# Patient Record
Sex: MALE
[De-identification: names, ages, dates, MRNs, and addresses within clinical notes are randomized per-mention and may not be internally consistent; named-entity substitution may affect disease eponyms.]

---

## 2019-06-11 ENCOUNTER — HOSPITAL ENCOUNTER (OUTPATIENT)
Dept: HOSPITAL 92 - BICULT | Age: 64
Discharge: HOME | End: 2019-06-11
Attending: INTERNAL MEDICINE
Payer: COMMERCIAL

## 2019-06-11 DIAGNOSIS — N17.9: Primary | ICD-10-CM

## 2019-06-11 PROCEDURE — 76770 US EXAM ABDO BACK WALL COMP: CPT

## 2019-06-11 NOTE — ULT
RENAL ULTRASOUND:

 

HISTORY: 

Acute renal failure.

 

FINDINGS: 

Real-time imaging of the right and left kidneys were performed.  The right kidney measures 9.1 cm and
 the left kidney 9.6 cm in size.  No signs of cyst, mass, or obstruction. 

 

The bladder is never fully distended . There is a suggestion of some possible bladder wall thickening
, but this could be on the basis of under distention.  

 

IMPRESSION: 

1.  Findings suggesting some mild bladder wall thickening, although this could be related mainly to u
nder distention, although this be related mainly to under distention.  

 

2.  No signs of any obstruction of either kidney.  Renal cortex appears fairly well preserved.  

 

POS: [facility]

## 2020-09-04 ENCOUNTER — HOSPITAL ENCOUNTER (OUTPATIENT)
Dept: HOSPITAL 92 - ULT | Age: 65
Discharge: HOME | End: 2020-09-04
Attending: INTERNAL MEDICINE
Payer: MEDICARE

## 2020-09-04 VITALS — DIASTOLIC BLOOD PRESSURE: 90 MMHG | SYSTOLIC BLOOD PRESSURE: 136 MMHG

## 2020-09-04 VITALS — BODY MASS INDEX: 29.7 KG/M2

## 2020-09-04 DIAGNOSIS — N18.3: ICD-10-CM

## 2020-09-04 DIAGNOSIS — R18.8: Primary | ICD-10-CM

## 2020-09-04 DIAGNOSIS — I12.9: ICD-10-CM

## 2020-09-04 DIAGNOSIS — Z79.899: ICD-10-CM

## 2020-09-04 LAB
APTT PPP: 34.1 SEC (ref 22.9–36.1)
BASOPHILS # BLD AUTO: 0.1 THOU/UL (ref 0–0.2)
BASOPHILS NFR BLD AUTO: 1.1 % (ref 0–1)
EOSINOPHIL # BLD AUTO: 0.1 THOU/UL (ref 0–0.7)
EOSINOPHIL NFR BLD AUTO: 1.9 % (ref 0–10)
HGB BLD-MCNC: 13 G/DL (ref 14–18)
INR PPP: 1.1
LYMPHOCYTES # BLD: 2 THOU/UL (ref 1.2–3.4)
LYMPHOCYTES NFR BLD AUTO: 28.7 % (ref 21–51)
MCH RBC QN AUTO: 27.5 PG (ref 27–31)
MCV RBC AUTO: 88.9 FL (ref 78–98)
MONOCYTES # BLD AUTO: 0.5 THOU/UL (ref 0.11–0.59)
MONOCYTES NFR BLD AUTO: 7.1 % (ref 0–10)
NEUTROPHILS # BLD AUTO: 4.3 THOU/UL (ref 1.4–6.5)
NEUTROPHILS NFR BLD AUTO: 61.3 % (ref 42–75)
NEUTROPHILS NFR FLD: 8 %
NONHEMATIC CELLS NFR FLD MANUAL: 22 %
PLATELET # BLD AUTO: 221 THOU/UL (ref 130–400)
PROTHROMBIN TIME: 14 SEC (ref 12–14.7)
RBC # BLD AUTO: 4.72 MILL/UL (ref 4.7–6.1)
RBC # FLD AUTO: 5608 /CU.MM
WBC # BLD AUTO: 7 THOU/UL (ref 4.8–10.8)
WBC # FLD AUTO: 238 UL

## 2020-09-04 PROCEDURE — 85060 BLOOD SMEAR INTERPRETATION: CPT

## 2020-09-04 PROCEDURE — 0W9G3ZZ DRAINAGE OF PERITONEAL CAVITY, PERCUTANEOUS APPROACH: ICD-10-PCS | Performed by: RADIOLOGY

## 2020-09-04 PROCEDURE — 87070 CULTURE OTHR SPECIMN AEROBIC: CPT

## 2020-09-04 PROCEDURE — 87205 SMEAR GRAM STAIN: CPT

## 2020-09-04 PROCEDURE — 85610 PROTHROMBIN TIME: CPT

## 2020-09-04 PROCEDURE — 85025 COMPLETE CBC W/AUTO DIFF WBC: CPT

## 2020-09-04 PROCEDURE — 85730 THROMBOPLASTIN TIME PARTIAL: CPT

## 2020-09-04 PROCEDURE — 89051 BODY FLUID CELL COUNT: CPT

## 2020-09-04 PROCEDURE — 49083 ABD PARACENTESIS W/IMAGING: CPT

## 2020-09-04 NOTE — ULT
Ultrasound-guided paracentesis:



HISTORY: Chronic kidney disease and ascites.



FINDINGS: Informed consent obtained prior to the procedure. Preprocedural imaging demonstrated intrap
eritoneal free fluid. Only a small amount of intraperitoneal free fluid is seen adjacent to the

right hepatic lobe.



An area was marked in the right upper quadrant, and then meticulously prepped and draped in normal st
erile fashion and anesthetized with 1% buffered lidocaine.



With direct sonographic guidance, a 25-gauge needle was advanced into the abdomen. Approximately 20 m
L of clear straw-colored fluid  was aspirated. The needle was removed, and hemostasis was achieved

with direct pressure. A dry sterile dressing was placed. The patient tolerated the procedure well and
 without immediate complication.



IMPRESSION: 



1. Technically successful ultrasound-guided paracentesis.

2 Small amount of ascites adjacent to the right hepatic lobe.



Reported By: Casper Moy 

Electronically Signed:  9/4/2020 4:38 PM

## 2020-09-22 ENCOUNTER — HOSPITAL ENCOUNTER (INPATIENT)
Dept: HOSPITAL 92 - ERS | Age: 65
LOS: 6 days | Discharge: HOME | DRG: 281 | End: 2020-09-28
Attending: INTERNAL MEDICINE | Admitting: INTERNAL MEDICINE
Payer: MEDICARE

## 2020-09-22 VITALS — BODY MASS INDEX: 31.1 KG/M2

## 2020-09-22 DIAGNOSIS — E11.22: ICD-10-CM

## 2020-09-22 DIAGNOSIS — I42.9: ICD-10-CM

## 2020-09-22 DIAGNOSIS — I47.2: ICD-10-CM

## 2020-09-22 DIAGNOSIS — D89.2: ICD-10-CM

## 2020-09-22 DIAGNOSIS — K42.9: ICD-10-CM

## 2020-09-22 DIAGNOSIS — N17.9: ICD-10-CM

## 2020-09-22 DIAGNOSIS — N18.5: ICD-10-CM

## 2020-09-22 DIAGNOSIS — K43.2: ICD-10-CM

## 2020-09-22 DIAGNOSIS — I50.23: Primary | ICD-10-CM

## 2020-09-22 DIAGNOSIS — Z79.899: ICD-10-CM

## 2020-09-22 DIAGNOSIS — E83.42: ICD-10-CM

## 2020-09-22 DIAGNOSIS — Z23: ICD-10-CM

## 2020-09-22 DIAGNOSIS — M10.9: ICD-10-CM

## 2020-09-22 DIAGNOSIS — K21.9: ICD-10-CM

## 2020-09-22 DIAGNOSIS — I49.3: ICD-10-CM

## 2020-09-22 DIAGNOSIS — M06.9: ICD-10-CM

## 2020-09-22 DIAGNOSIS — Z79.82: ICD-10-CM

## 2020-09-22 DIAGNOSIS — Z20.828: ICD-10-CM

## 2020-09-22 DIAGNOSIS — D63.1: ICD-10-CM

## 2020-09-22 DIAGNOSIS — I21.A1: ICD-10-CM

## 2020-09-22 DIAGNOSIS — I12.9: ICD-10-CM

## 2020-09-22 LAB
ALBUMIN SERPL BCG-MCNC: 3.3 G/DL (ref 3.4–4.8)
ALP SERPL-CCNC: 142 U/L (ref 40–110)
ALT SERPL W P-5'-P-CCNC: 17 U/L (ref 8–55)
ANION GAP SERPL CALC-SCNC: 16 MMOL/L (ref 10–20)
AST SERPL-CCNC: 19 U/L (ref 5–34)
BASOPHILS # BLD AUTO: 0.1 THOU/UL (ref 0–0.2)
BASOPHILS NFR BLD AUTO: 1 % (ref 0–1)
BILIRUB SERPL-MCNC: 0.9 MG/DL (ref 0.2–1.2)
BUN SERPL-MCNC: 30 MG/DL (ref 8.4–25.7)
CALCIUM SERPL-MCNC: 8.7 MG/DL (ref 7.8–10.44)
CHLORIDE SERPL-SCNC: 109 MMOL/L (ref 98–107)
CK MB SERPL-MCNC: 6.1 NG/ML (ref 0–6.6)
CO2 SERPL-SCNC: 20 MMOL/L (ref 23–31)
CREAT CL PREDICTED SERPL C-G-VRATE: 0 ML/MIN (ref 70–130)
EOSINOPHIL # BLD AUTO: 0.1 THOU/UL (ref 0–0.7)
EOSINOPHIL NFR BLD AUTO: 1.8 % (ref 0–10)
GLOBULIN SER CALC-MCNC: 3 G/DL (ref 2.4–3.5)
GLUCOSE SERPL-MCNC: 108 MG/DL (ref 80–115)
HGB BLD-MCNC: 12.8 G/DL (ref 14–18)
LYMPHOCYTES # BLD: 1.8 THOU/UL (ref 1.2–3.4)
LYMPHOCYTES NFR BLD AUTO: 23.3 % (ref 21–51)
MCH RBC QN AUTO: 27.7 PG (ref 27–31)
MCV RBC AUTO: 88.3 FL (ref 78–98)
MONOCYTES # BLD AUTO: 0.5 THOU/UL (ref 0.11–0.59)
MONOCYTES NFR BLD AUTO: 7 % (ref 0–10)
NEUTROPHILS # BLD AUTO: 5 THOU/UL (ref 1.4–6.5)
NEUTROPHILS NFR BLD AUTO: 66.8 % (ref 42–75)
PLATELET # BLD AUTO: 220 THOU/UL (ref 130–400)
POTASSIUM SERPL-SCNC: 4 MMOL/L (ref 3.5–5.1)
RBC # BLD AUTO: 4.63 MILL/UL (ref 4.7–6.1)
SODIUM SERPL-SCNC: 141 MMOL/L (ref 136–145)
TROPONIN I SERPL DL<=0.01 NG/ML-MCNC: 0.06 NG/ML (ref ?–0.03)
WBC # BLD AUTO: 7.5 THOU/UL (ref 4.8–10.8)

## 2020-09-22 PROCEDURE — 93017 CV STRESS TEST TRACING ONLY: CPT

## 2020-09-22 PROCEDURE — U0003 INFECTIOUS AGENT DETECTION BY NUCLEIC ACID (DNA OR RNA); SEVERE ACUTE RESPIRATORY SYNDROME CORONAVIRUS 2 (SARS-COV-2) (CORONAVIRUS DISEASE [COVID-19]), AMPLIFIED PROBE TECHNIQUE, MAKING USE OF HIGH THROUGHPUT TECHNOLOGIES AS DESCRIBED BY CMS-2020-01-R: HCPCS

## 2020-09-22 PROCEDURE — 96365 THER/PROPH/DIAG IV INF INIT: CPT

## 2020-09-22 PROCEDURE — 80053 COMPREHEN METABOLIC PANEL: CPT

## 2020-09-22 PROCEDURE — 77075 RADEX OSSEOUS SURVEY COMPL: CPT

## 2020-09-22 PROCEDURE — 97139 UNLISTED THERAPEUTIC PX: CPT

## 2020-09-22 PROCEDURE — 93306 TTE W/DOPPLER COMPLETE: CPT

## 2020-09-22 PROCEDURE — 93005 ELECTROCARDIOGRAM TRACING: CPT

## 2020-09-22 PROCEDURE — 82232 ASSAY OF BETA-2 PROTEIN: CPT

## 2020-09-22 PROCEDURE — 93798 PHYS/QHP OP CAR RHAB W/ECG: CPT

## 2020-09-22 PROCEDURE — 93458 L HRT ARTERY/VENTRICLE ANGIO: CPT

## 2020-09-22 PROCEDURE — 83880 ASSAY OF NATRIURETIC PEPTIDE: CPT

## 2020-09-22 PROCEDURE — 76942 ECHO GUIDE FOR BIOPSY: CPT

## 2020-09-22 PROCEDURE — 78452 HT MUSCLE IMAGE SPECT MULT: CPT

## 2020-09-22 PROCEDURE — 96375 TX/PRO/DX INJ NEW DRUG ADDON: CPT

## 2020-09-22 PROCEDURE — 94760 N-INVAS EAR/PLS OXIMETRY 1: CPT

## 2020-09-22 PROCEDURE — 84484 ASSAY OF TROPONIN QUANT: CPT

## 2020-09-22 PROCEDURE — 80306 DRUG TEST PRSMV INSTRMNT: CPT

## 2020-09-22 PROCEDURE — 36415 COLL VENOUS BLD VENIPUNCTURE: CPT

## 2020-09-22 PROCEDURE — A9500 TC99M SESTAMIBI: HCPCS

## 2020-09-22 PROCEDURE — 82553 CREATINE MB FRACTION: CPT

## 2020-09-22 PROCEDURE — G0009 ADMIN PNEUMOCOCCAL VACCINE: HCPCS

## 2020-09-22 PROCEDURE — 87635 SARS-COV-2 COVID-19 AMP PRB: CPT

## 2020-09-22 PROCEDURE — 83883 ASSAY NEPHELOMETRY NOT SPEC: CPT

## 2020-09-22 PROCEDURE — 84165 PROTEIN E-PHORESIS SERUM: CPT

## 2020-09-22 PROCEDURE — P9047 ALBUMIN (HUMAN), 25%, 50ML: HCPCS

## 2020-09-22 PROCEDURE — 90471 IMMUNIZATION ADMIN: CPT

## 2020-09-22 PROCEDURE — 90732 PPSV23 VACC 2 YRS+ SUBQ/IM: CPT

## 2020-09-22 PROCEDURE — 71046 X-RAY EXAM CHEST 2 VIEWS: CPT

## 2020-09-22 PROCEDURE — 83735 ASSAY OF MAGNESIUM: CPT

## 2020-09-22 PROCEDURE — 80048 BASIC METABOLIC PNL TOTAL CA: CPT

## 2020-09-22 PROCEDURE — 85025 COMPLETE CBC W/AUTO DIFF WBC: CPT

## 2020-09-22 PROCEDURE — 80061 LIPID PANEL: CPT

## 2020-09-22 NOTE — RAD
CHEST TWO VIEWS:

9/22/20

 

COMPARISON: 

None. 

 

HISTORY: 

Fluid overload.

 

FINDINGS: 

Cardiac silhouette appears enlarged.  Small bilateral pleural effusions are suspected, left greater t
kelsey right. There is pulmonary vascular congestion. No pneumothorax is seen. No lobar consolidation or
 alveolar edema. 

 

IMPRESSION: 

Enlarged cardiac silhouette with small bilateral pleural effusions and pulmonary vascular congestion 
suspicious for congestive failure and associated edema. Recommend follow-up imaging following treatme
nt to document resolution. 

 

POS: [facility]

## 2020-09-22 NOTE — PDOC.HHP
Hospitalist HPI





- History of Present Illness


Volume overload, shortness of breath


History of Present Illness: 


Patient is a 65 year old male with PMH hernia, gout, HTN, CKD, GERD sent from Dr Brody office for volume overload. Patient is a poor historian, but reports that 

Dr Bailey is basically his PCP since his wife saw Dr Bailey while she was on HD (now 

has kidney transplant). He went to Dr Bailey today for some shortness of breath 

which has been worsening over last several months, worse with exertion. He also 

reports extreme tiredness, swelling in BLE. Patient is on lasix every other day 

which was helping somewhat, he has been on this for a few months. He does report

some CKD history, GFR 30 here, but denies any history of urinary retention, no 

history of CHF or other cardiac testing or cardiac history. He deniest chest 

pain or palptiations. Dr Bailey observed signs of overload, such as JVD and 

abnormal EKG, patient referred to ED for cardiac workup. In ED, CXR w/ 

congestion, TnI 0.075, BNP over 99957, exam w/ s3, 2+ pitting edema, vital signs

hypertensive and tachypneic, EKG w/ 1st degree AVB, nonspecfic T wave changes, 

patient received asa, nitropaste, IV lasix, admitted for further workup and 

care.





Hospitalist ROS





- Review of Systems


Constitutional: denies: fever, chills, sweats, weakness, malaise, other


Eyes: denies: pain, vision change, conjunctivae inflammation, eyelid 

inflammation, redness, other


ENT: denies: ear pain, ear discharge, nose pain, nose discharge, nose congest

ion, mouth pain, mouth swelling, throat pain, throat swelling, other


Respiratory: reports: shortness of breath, SOB with excertion.  denies: cough, 

dry, hemoptysis, pleuritic pain, sputum, wheezing, other


Cardiovascular: denies: chest pain, palpitations, orthopnea, paroxysmal noc. 

dyspnea, edema, light headedness, other


Gastrointestinal: denies: nausea, vomiting, abdominal pain, diarrhea, 

constipation, melena, hematochezia, other


Genitourinary: denies: dysuria, frequency, incontinence, hematuria, retention, 

other


Musculoskeletal: denies: neck pain, shoulder pain, arm pain, back pain, hand 

pain, leg pain, foot pain, other


Skin: denies: rash, lesions, kacy, bruising, other


Neurological: denies: weakness, numbness, incoordination, change in speech, 

confusion, seizures, other


All other systems reviewed; all pertinent +/- noted in HPI/Subj





- Medication


Medications: 


metoprolol tartrate oral Tue Sep 22, 2020 19:39 ABUNDIO Freitas, Fay 


tablet : Strength - 25 mg : ORAL


Patient Dose: 1 tab(s) Oral once a day (at bedtime). 


omeprazole Tue Sep 22, 2020 19:40 ABUNDIO Freitas, Fay 


capsule,delayed release(DR/EC) : Strength - 10 mg : ORAL


Patient Dose: Unknown. 


Lasix oral Tue Sep 22, 2020 19:40 ABUNDIO Freitas Mikayla 


tablet : Strength - 40 mg : ORAL


Patient Dose: 1 tab(s) Oral once a day.TAKE ONE PILL EVERY OTHER DAY.





Hospitalist History





- Past Medical History


Other Medical History: 


hernia, gout, HTN, CKD, GERD





- Past Surgical History


Other Surgical History: 


colon surgery





- Family History


Family History: reports: no pertinent history





- Social History


Smoking Status: Never smoker


Alcohol: reports: None


Drugs: reports: none





- Exam


General Appearance: NAD, awake alert


Eye: PERRL, anicteric sclera


ENT: normocephalic atraumatic, no oropharyngeal lesions, moist mucosa


Neck: supple, symmetric, no JVD, no thyromegaly, no lymphadenopathy, no carotid 

bruit


Heart: RRR, no murmur, no gallops, no rubs, normal peripheral pulses


Heart - other findings: s3


Respiratory: CTAB, normal chest expansion, no tachypnea, normal percussion


Respiratory - other findings: bibasilar dependant crackles.


Gastrointestinal: soft, non-tender, non-distended, normal bowel sounds, no 

palpable masses, no hepatomegaly, no splenomegaly, no bruit


Extremities: no cyanosis, no clubbing, 2+ LE edema


Skin: normal turgor, no lesions, no rashes


Neurological: cranial nerve grossly intact, normal sensation to touch, no 

weakness, no focal deficits, no new deficit


Musculoskeletal: normal tone, normal strength, no muscle wasting


Psychiatric: normal affect, normal behavior, A&O x 3





Hospitalist Results





- Labs


Result Diagrams: 


                                 09/23/20 03:33





                                 09/22/20 17:21


Lab results: 


                                        











WBC  7.5 thou/uL (4.8-10.8)   09/22/20  17:21    


 


Hgb  12.8 g/dL (14.0-18.0)  L  09/22/20  17:21    


 


Hct  40.9 % (42.0-52.0)  L  09/22/20  17:21    


 


MCV  88.3 fL (78.0-98.0)   09/22/20  17:21    


 


Plt Count  220 thou/uL (130-400)   09/22/20  17:21    


 


Neutrophils %  66.8 % (42.0-75.0)   09/22/20  17:21    


 


Sodium  141 mmol/L (136-145)   09/22/20  17:21    


 


Potassium  4.0 mmol/L (3.5-5.1)   09/22/20  17:21    


 


Chloride  109 mmol/L ()  H  09/22/20  17:21    


 


Carbon Dioxide  20 mmol/L (23-31)  L  09/22/20  17:21    


 


BUN  30 mg/dL (8.4-25.7)  H  09/22/20  17:21    


 


Creatinine  2.64 mg/dL (0.7-1.3)  H  09/22/20  17:21    


 


Glucose  108 mg/dL ()   09/22/20  17:21    


 


Calcium  8.7 mg/dL (7.8-10.44)   09/22/20  17:21    


 


Total Bilirubin  0.9 mg/dL (0.2-1.2)   09/22/20  17:21    


 


AST  19 U/L (5-34)   09/22/20  17:21    


 


ALT  17 U/L (8-55)   09/22/20  17:21    


 


Alkaline Phosphatase  142 U/L ()  H  09/22/20  17:21    


 


CK-MB (CK-2)  6.1 ng/mL (0-6.6)   09/22/20  17:21    


 


Troponin I  0.064 ng/mL (< 0.028)  H  09/22/20  20:44    


 


B-Natriuretic Peptide  85591.3 pg/mL (0-100)  H  09/22/20  17:21    


 


Serum Total Protein  6.3 g/dL (5.8-8.1)   09/22/20  17:21    


 


Albumin  3.3 g/dL (3.4-4.8)  L  09/22/20  17:21    











Additional comment: 





VITAL SIGNS Tue Sep 22, 2020 19:43 ABUNDIO Freitas, Fay 


BP: 145/116


Pulse: 89


Resp: 22


Temp: 97.7 (Oral)


Pain: 0


O2 sat: 96 on (Room Air)


Time: 9/22/2020 19:43.





- EKG Interpretation


EKG: 





NSR 89 bpm, QTc 464, /AVB, no acute St changes, nonspecific T wave changes





Hospitalist H&P A/P





- Plan


Plan: 


Patient is a 65 year old male with PMH hernia, gout, HTN, CKD, GERD sent from Dr Brody office for volume overload.





# volume overload


# CKD III-IV


# possible CHF


# elevated troponin - concerning for CHF


Patient went to Dr Bailey today for progressive shortness of breath, fatigue, 

swelling in BLE, on PO lasix, no history of CHF or other cardiac testing or 

cardiac history. He denies chest pain or palptiations. In ED, CXR w/ congestion,

TnI 0.075, BNP over 97384, exam w/ s3, 2+ pitting edema, vital signs hypertensiv

e and tachypneic, EKG w/ 1st degree AVB, nonspecfic T wave changes, patient 

received asa, nitropaste, IV lasix, admitted for further workup and care.


- admit to telemetry


- start IV lasix


- consult cardiology


- echo, stress test


- I/Os


- continue asa, nitropatch, statin, BB





# hypomagnisemia - replacement parameters





# HTN - PRN medications available





# gout - resume home meds once med rec complete





# GERD - ppx ordered





DVT/GI ppx


Full code

## 2020-09-23 LAB
ANION GAP SERPL CALC-SCNC: 15 MMOL/L (ref 10–20)
BASOPHILS # BLD AUTO: 0.1 THOU/UL (ref 0–0.2)
BASOPHILS NFR BLD AUTO: 0.7 % (ref 0–1)
BUN SERPL-MCNC: 29 MG/DL (ref 8.4–25.7)
CALCIUM SERPL-MCNC: 8.6 MG/DL (ref 7.8–10.44)
CHD RISK SERPL-RTO: 3.1 (ref ?–4.5)
CHLORIDE SERPL-SCNC: 109 MMOL/L (ref 98–107)
CHOLEST SERPL-MCNC: 117 MG/DL
CO2 SERPL-SCNC: 21 MMOL/L (ref 23–31)
CREAT CL PREDICTED SERPL C-G-VRATE: 38 ML/MIN (ref 70–130)
EOSINOPHIL # BLD AUTO: 0.3 THOU/UL (ref 0–0.7)
EOSINOPHIL NFR BLD AUTO: 3.1 % (ref 0–10)
GLUCOSE SERPL-MCNC: 139 MG/DL (ref 80–115)
HDLC SERPL-MCNC: 38 MG/DL
HGB BLD-MCNC: 11.8 G/DL (ref 14–18)
LDLC SERPL CALC-MCNC: 61 MG/DL
LYMPHOCYTES # BLD: 1.8 THOU/UL (ref 1.2–3.4)
LYMPHOCYTES NFR BLD AUTO: 21.2 % (ref 21–51)
MAGNESIUM SERPL-MCNC: 1.7 MG/DL (ref 1.6–2.6)
MCH RBC QN AUTO: 26.9 PG (ref 27–31)
MCV RBC AUTO: 88.6 FL (ref 78–98)
MONOCYTES # BLD AUTO: 0.7 THOU/UL (ref 0.11–0.59)
MONOCYTES NFR BLD AUTO: 7.8 % (ref 0–10)
NEUTROPHILS # BLD AUTO: 5.7 THOU/UL (ref 1.4–6.5)
NEUTROPHILS NFR BLD AUTO: 67.2 % (ref 42–75)
PLATELET # BLD AUTO: 212 THOU/UL (ref 130–400)
POTASSIUM SERPL-SCNC: 3.6 MMOL/L (ref 3.5–5.1)
RBC # BLD AUTO: 4.4 MILL/UL (ref 4.7–6.1)
SODIUM SERPL-SCNC: 141 MMOL/L (ref 136–145)
TRIGL SERPL-MCNC: 92 MG/DL (ref ?–150)
TROPONIN I SERPL DL<=0.01 NG/ML-MCNC: 0.09 NG/ML (ref ?–0.03)
TROPONIN I SERPL DL<=0.01 NG/ML-MCNC: 0.09 NG/ML (ref ?–0.03)
WBC # BLD AUTO: 8.5 THOU/UL (ref 4.8–10.8)

## 2020-09-23 PROCEDURE — 3E0234Z INTRODUCTION OF SERUM, TOXOID AND VACCINE INTO MUSCLE, PERCUTANEOUS APPROACH: ICD-10-PCS | Performed by: INTERNAL MEDICINE

## 2020-09-23 RX ADMIN — HEPARIN SODIUM SCH: 5000 INJECTION, SOLUTION INTRAVENOUS; SUBCUTANEOUS at 08:52

## 2020-09-23 RX ADMIN — ASPIRIN 81 MG CHEWABLE TABLET SCH MG: 81 TABLET CHEWABLE at 08:51

## 2020-09-23 RX ADMIN — Medication SCH ML: at 20:26

## 2020-09-23 RX ADMIN — Medication SCH ML: at 08:53

## 2020-09-23 RX ADMIN — NITROGLYCERIN SCH: 20 OINTMENT TOPICAL at 05:56

## 2020-09-23 RX ADMIN — NITROGLYCERIN SCH INCH: 20 OINTMENT TOPICAL at 15:32

## 2020-09-23 RX ADMIN — HEPARIN SODIUM SCH UNITS: 5000 INJECTION, SOLUTION INTRAVENOUS; SUBCUTANEOUS at 20:25

## 2020-09-23 RX ADMIN — NITROGLYCERIN SCH INCH: 20 OINTMENT TOPICAL at 20:25

## 2020-09-23 NOTE — CON
DATE OF CONSULTATION:  09/23/2020



REASON FOR CONSULTATION:  Congestive heart failure, abnormal stress test.



HISTORY OF PRESENT ILLNESS:  Mr. Gomez is a 65-year-old patient.  The patient has a

history of renal insufficiency, also has a history of some shortness of breath, but

in the last few months, his shortness of breath has been progressively worse.  It is

worse with exertion and now with a very low level activity.  He also noted swelling

of his lower extremities. 



He was taking Lasix every other day, which helped, but the problem was continuing to

worsen. 



The patient was sent to the emergency room, found to be in congestive heart failure. 



He has received Lasix with some results. 



No chest pain or pressure.



MEDICATIONS:  

1. Lasix every other day.

2. Metoprolol 25 mg each evening.

3. Allopurinol.

4. Multivitamin.



ALLERGIES:  NONE KNOWN.



SOCIAL HISTORY:  No alcohol or tobacco.  He is .  His wife is accompanying

him.  His wife has had renal transplantation. 



REVIEW OF SYSTEMS:  CONSTITUTIONAL:  No significant weight loss. 

VISION:  No changes. 

HEARING:  No changes. 

PULMONARY:  Positive for shortness of breath. 

CARDIAC:  Positive for shortness of breath. 

GASTROINTESTINAL:  No nausea, vomiting, or diarrhea. 

SKIN:  No rashes. 

NEUROLOGIC:  No unilateral weakness or numbness. 

PSYCHIATRIC:  No unusual depression or anxiety. 

HEMATOLOGIC:  No unusual bruising. 

GENITOURINARY:  No burning with urination.



PHYSICAL EXAMINATION:

GENERAL:  This is a pleasant 65-year-old man, resting comfortably, in no distress. 

VITAL SIGNS:  Blood pressure 130/90.  Pulse in the 90s, it is sinus. 

NECK:  Veins are markedly distended, really up to the angle of the jaw when he is

lying back about 45 degrees. 

LUNGS:  Few basilar rales. 

CARDIAC:  Normal S1, normal S2.  I do not hear murmur, rub, or gallop. 

ABDOMEN:  Soft, nontender, he may have some ascites.  He has a large ventral hernia. 

EXTREMITIES:  Moderate edema, some of which looks chronic.  Chronic venous stasis

changes. 

PULSES:  I do not feel pedal pulses.  I do feel femoral pulses, right better than

the left. 



PERTINENT LABORATORY DATA:  The creatinine yesterday was 2.64, which correlates with

a GFR calculated at 29.  I suspect it is not that good as he has substantial amounts

of edema and his dry weight is really lower than what his actual weight is

currently.  EKG, sinus rhythm, possible anterior infarct.  The troponin levels are

elevated, but flat, most recently 0.089.  Stress test revealed ejection fraction

25%, no focal ischemia. 



ASSESSMENT:  

1. Congestive heart failure, systolic, acute on chronic, still in some degree of

heart failure. 

2. Stage IV renal failure.

3. Substantial edema.

4. Probably, he has had some peripheral vascular disease. 



At some point, the patient will need cardiac catheterization, but I would like to

improve his heart failure status first.  We will continue with intravenous

diuretics.  We will tentatively plan to proceed to cardiac catheterization on

Friday.  I discussed the risk with the patient and wife including kidney failure and

need for dialysis, risk of stroke, heart attack, iodine allergy, loss of blood

supply to leg or kidney, stent thrombosis, stent restenosis.  They understand and

wish to proceed.  The patient ultimately may need defibrillator implantation, will

likely need a LifeVest.  Long-term prognosis is guarded in view of this degree of

left ventricular dysfunction with dilated left ventricle in the setting of advanced

renal failure.  We will follow with you.  At this time, could not start ACE

inhibitors or angiotensin receptor blockers in view of the kidney function.  We will

change from metoprolol to carvedilol. 







Job ID:  [id number]

## 2020-09-23 NOTE — NM
CARDIAC SPECT:

 

CLINICAL HISTORY:

65-year-old male with CHF, hypertension. 

 

TECHNIQUE:  

A myocardial perfusion scan was performed using the single isotope one day protocol with technetium-9
9m sestamibi. 10 mCi were injected intravenously for the rest exam followed by 29 mCi for the stress 
exam. Pharmacologic stress with Adenosine was monitored and interpreted by Dr. Guillory. 

 

FINDINGS:

The left ventricular cavity is dilated. No reversible or fixed defects are seen on the stress or rest
 images. 

 

GATED SPECT LVEF:

25%. 

 

WALL MOTION EXAM:

Global hypokinesis. 

 

IMPRESSION: 

No evidence of reversible ischemia. 

 

 

POS: [facility]

## 2020-09-23 NOTE — PDOC.HOSPP
- Subjective


Encounter Date: 09/23/20


Encounter Time: 13:40


Subjective: 


f/u for volume overload and likely CHF in conjunction with CKD. Receiving Lasix 

IV and 





- Objective


Vital Signs & Weight: 


                             Vital Signs (12 hours)











  Temp Pulse Resp BP Pulse Ox


 


 09/23/20 13:25  97.7 F  98  16  120/79  95


 


 09/23/20 07:39  98.5 F  89  12  137/96 H  95


 


 09/23/20 04:00  97.8 F  92  18  130/96 H  97








                                     Weight











Weight                         210 lb 14.4 oz














Result Diagrams: 


                                 09/23/20 03:33





                                 09/23/20 03:33


Additional Labs: 


                                Laboratory Tests











  09/22/20 09/22/20 09/22/20





  17:21 17:21 17:21


 


Hgb   12.8 L 


 


Creatinine    2.64 H


 


Magnesium   


 


Troponin I   


 


B-Natriuretic Peptide  31307.3 H  














  09/22/20 09/22/20 09/22/20





  17:21 17:29 20:44


 


Hgb   


 


Creatinine   


 


Magnesium   1.5 L 


 


Troponin I  0.075 H   0.064 H


 


B-Natriuretic Peptide   














  09/23/20 09/23/20





  01:00 03:33


 


Hgb  


 


Creatinine  


 


Magnesium  


 


Troponin I  0.088 H  0.089 H


 


B-Natriuretic Peptide  











Radiology Reviewed by me: Yes (PCXR - bilat pulm edema)


EKG Reviewed by me: Yes (Tele - SR)





Hospitalist ROS





- Medication


Medications: 


Active Medications











Generic Name Dose Route Start Last Admin





  Trade Name Freq  PRN Reason Stop Dose Admin


 


Aspirin  81 mg  09/23/20 09:00  09/23/20 08:51





  Aspirin Chewable 81 Mg Tab  PO   81 mg





  DAILY KETAN   Administration


 


Furosemide  40 mg  09/23/20 09:00  09/23/20 08:51





  Furosemide 40 Mg/4 Ml Vial  SLOW IVP   40 mg





  DAILY KETAN   Administration


 


Heparin Sodium (Porcine)  5,000 units  09/23/20 09:00  09/23/20 08:52





  Heparin 5,000 Units/Ml Vial  SC   Not Given





  BID KETAN  


 


Nitroglycerin  0.5 inch  09/23/20 06:00  09/23/20 05:56





  Nitroglycerin 2% Ointment 1 Inch/1 Gm Packet  TOP   Not Given





  Q8HR KETAN  


 


Pantoprazole Sodium  40 mg  09/23/20 09:00  09/23/20 08:51





  Pantoprazole 40 Mg Tab  PO   40 mg





  DAILY KETAN   Administration


 


Sodium Chloride  10 ml  09/23/20 09:00  09/23/20 08:53





  Flush - Normal Saline 10 Ml Syringe  IVF   10 ml





  Q12HR KETAN   Administration














- Exam


General Appearance: NAD, awake alert


Eye: PERRL, anicteric sclera


ENT: normocephalic atraumatic, no oropharyngeal lesions


Neck: supple, symmetric, no JVD, no thyromegaly, no lymphadenopathy


Heart: RRR, no gallops, no rubs, normal peripheral pulses


Heart - other findings: S1, S2, faint S3


Respiratory: no ronchi, normal chest expansion, no tachypnea


Respiratory - other findings: bibasilar rales


Gastrointestinal: soft, non-tender, non-distended, normal bowel sounds


Extremities: no cyanosis, 2+ LE edema


Skin: normal turgor


Neurological: cranial nerve grossly intact, no new deficit


Musculoskeletal: normal tone, normal strength


Psychiatric: normal affect, A&O x 3





Hosp A/P


(1) Acute CHF (congestive heart failure)


Code(s): I50.9 - HEART FAILURE, UNSPECIFIED   Status: Acute   


Qualifiers: 


   Heart failure type: unspecified   Qualified Code(s): I50.9 - Heart failure, 

unspecified   


Plan: 


Continue Lasix 40mg IV daily, daily I/O's, daily weight, 2D echo pending








(2) Acute kidney injury superimposed on CKD


Code(s): N17.9 - ACUTE KIDNEY FAILURE, UNSPECIFIED; N18.9 - CHRONIC KIDNEY 

DISEASE, UNSPECIFIED   Status: Acute   


Plan: 


Monitor renal function closely given diuretics, serial creatinine








(3) Type 2 myocardial infarction due to heart failure


Code(s): I50.9 - HEART FAILURE, UNSPECIFIED; I21.A1 - MYOCARDIAL INFARCTION TYPE

2   Status: Acute   


Plan: 


Cardiology consult pending, 2D echo, ? consideration for heart cath, CST pending








(4) Hypomagnesemia


Code(s): E83.42 - HYPOMAGNESEMIA   Status: Acute   


Plan: 


Improved with supplementation, serial monitoring








(5) HTN (hypertension)


Code(s): I10 - ESSENTIAL (PRIMARY) HYPERTENSION   Status: Chronic   


Qualifiers: 


   Hypertension type: essential hypertension   Qualified Code(s): I10 - 

Essential (primary) hypertension   


Plan: 


Continue Metoprolol








- Plan


out of bed/ambulate, DVT proph w/SCDs





Stable currently


Continue Lasix 40mg IV daily


Serial I/O's, Daily weight


2D echo pending


Cardiology consult pending


AM lab: CMP

## 2020-09-24 LAB
ALBUMIN SERPL BCG-MCNC: 3 G/DL (ref 3.4–4.8)
ALP SERPL-CCNC: 119 U/L (ref 40–110)
ALT SERPL W P-5'-P-CCNC: 13 U/L (ref 8–55)
ANION GAP SERPL CALC-SCNC: 15 MMOL/L (ref 10–20)
AST SERPL-CCNC: 20 U/L (ref 5–34)
BILIRUB SERPL-MCNC: 1.3 MG/DL (ref 0.2–1.2)
BUN SERPL-MCNC: 26 MG/DL (ref 8.4–25.7)
CALCIUM SERPL-MCNC: 8.7 MG/DL (ref 7.8–10.44)
CHLORIDE SERPL-SCNC: 109 MMOL/L (ref 98–107)
CO2 SERPL-SCNC: 24 MMOL/L (ref 23–31)
CREAT CL PREDICTED SERPL C-G-VRATE: 40 ML/MIN (ref 70–130)
GLOBULIN SER CALC-MCNC: 3 G/DL (ref 2.4–3.5)
GLUCOSE SERPL-MCNC: 100 MG/DL (ref 80–115)
POTASSIUM SERPL-SCNC: 3.6 MMOL/L (ref 3.5–5.1)
SODIUM SERPL-SCNC: 144 MMOL/L (ref 136–145)

## 2020-09-24 RX ADMIN — HEPARIN SODIUM SCH UNITS: 5000 INJECTION, SOLUTION INTRAVENOUS; SUBCUTANEOUS at 09:10

## 2020-09-24 RX ADMIN — NITROGLYCERIN SCH INCH: 20 OINTMENT TOPICAL at 05:00

## 2020-09-24 RX ADMIN — Medication SCH ML: at 09:10

## 2020-09-24 RX ADMIN — HEPARIN SODIUM SCH UNITS: 5000 INJECTION, SOLUTION INTRAVENOUS; SUBCUTANEOUS at 20:34

## 2020-09-24 RX ADMIN — ASPIRIN 81 MG CHEWABLE TABLET SCH MG: 81 TABLET CHEWABLE at 09:09

## 2020-09-24 RX ADMIN — Medication SCH ML: at 20:35

## 2020-09-24 RX ADMIN — NITROGLYCERIN SCH INCH: 20 OINTMENT TOPICAL at 22:52

## 2020-09-24 RX ADMIN — NITROGLYCERIN SCH INCH: 20 OINTMENT TOPICAL at 15:23

## 2020-09-24 NOTE — PDOC.HOSPP
- Subjective


Encounter Date: 09/24/20


Encounter Time: 12:35


Subjective: 


f/u for Acute/chronic systolic CHF with EF 25% initially managed with IV Lasix. 

CST performed showing global hypokinesis with plans for heart catheterization 

9/25/20. Overall feels ok and no new complaints. 





- Objective


Vital Signs & Weight: 


                             Vital Signs (12 hours)











  Temp Pulse Resp BP Pulse Ox


 


 09/24/20 11:30  98.7 F  84  18  116/79  96


 


 09/24/20 07:22  98.6 F  92  17  134/100 H  95


 


 09/24/20 03:18  98.4 F  91  16  134/96 H  97








                                     Weight











Weight                         210 lb 14.4 oz














I&O: 


                                        











 09/23/20 09/24/20 09/25/20





 06:59 06:59 06:59


 


Intake Total  268 


 


Balance  268 











Result Diagrams: 


                                 09/23/20 03:33





                                 09/24/20 04:56


Radiology Reviewed by me: Yes (2D echo - EF 20-25%, mod-sev TR, mod MR, PA 43mm 

Hg)


EKG Reviewed by me: Yes (Tele - SR)





Hospitalist ROS





- Medication


Medications: 


Active Medications











Generic Name Dose Route Start Last Admin





  Trade Name Freq  PRN Reason Stop Dose Admin


 


Aspirin  81 mg  09/23/20 09:00  09/24/20 09:09





  Aspirin Chewable 81 Mg Tab  PO   81 mg





  DAILY KETAN   Administration


 


Atorvastatin Calcium  40 mg  09/23/20 21:00  09/23/20 20:25





  Atorvastatin Calcium 40 Mg Tab  PO   40 mg





  HS KETAN   Administration


 


Heparin Sodium (Porcine)  5,000 units  09/23/20 09:00  09/24/20 09:10





  Heparin 5,000 Units/Ml Vial  SC  09/24/20 21:00  5,000 units





  BID KETAN   Administration


 


Nitroglycerin  0.5 inch  09/23/20 06:00  09/24/20 05:00





  Nitroglycerin 2% Ointment 1 Inch/1 Gm Packet  TOP   0.5 inch





  Q8HR KETAN   Administration


 


Pantoprazole Sodium  40 mg  09/23/20 09:00  09/24/20 09:09





  Pantoprazole 40 Mg Tab  PO   40 mg





  DAILY KETAN   Administration


 


Sodium Chloride  10 ml  09/23/20 09:00  09/24/20 09:10





  Flush - Normal Saline 10 Ml Syringe  IVF   10 ml





  Q12HR KETAN   Administration














- Exam


General Appearance: NAD, awake alert


Eye: PERRL, anicteric sclera


ENT: normocephalic atraumatic, no oropharyngeal lesions


Neck: supple, symmetric, no thyromegaly, no lymphadenopathy, JVD


Heart: RRR, no gallops, no rubs, normal peripheral pulses


Heart - other findings: S1, S2


Respiratory - other findings: bibasilar crackles, few rhonchi


Gastrointestinal: soft, non-tender, normal bowel sounds, no palpable masses


Extremities: no cyanosis, 2+ LE edema


Skin: normal turgor, no lesions


Neurological: cranial nerve grossly intact, no new deficit


Musculoskeletal: normal tone, normal strength, no muscle wasting


Psychiatric: normal affect, A&O x 3





Hosp A/P


(1) Acute systolic CHF (congestive heart failure)


Code(s): I50.21 - ACUTE SYSTOLIC (CONGESTIVE) HEART FAILURE   Status: Acute   


Plan: 


EF 20-25% by Echo, plan for heart catheterization to further define coronary a

natomy, continue medical mgmt with Lasix/Coreg/ASA








(2) Acute kidney injury superimposed on CKD


Code(s): N17.9 - ACUTE KIDNEY FAILURE, UNSPECIFIED; N18.9 - CHRONIC KIDNEY 

DISEASE, UNSPECIFIED   Status: Acute   


Plan: 


Continue supportive mgmt, low-volume IVF's started in preparation for LHC in am,

serial creatinine monitoring








(3) Type 2 myocardial infarction due to heart failure


Code(s): I50.9 - HEART FAILURE, UNSPECIFIED; I21.A1 - MYOCARDIAL INFARCTION TYPE

2   Status: Acute   


Plan: 


See mgmt above, ASA/Lasix/Coreg








(4) Hypomagnesemia


Code(s): E83.42 - HYPOMAGNESEMIA   Status: Acute   


Plan: 


Improved after Mg++ supplementation








(5) HTN (hypertension)


Code(s): I10 - ESSENTIAL (PRIMARY) HYPERTENSION   Status: Chronic   


Qualifiers: 


   Hypertension type: essential hypertension   Qualified Code(s): I10 - Es

sential (primary) hypertension   





- Plan


plan discussed w/ family, , out of bed/ambulate, DVT proph w/SCDs





Stable currently


Continue Lasix 40mg IV daily


Serial I/O's, Daily weight


Plan for heart catheterization in am 9/25/20


Cardiology consult appreciated


Continue ASA/Coreg/Lipitor


AM lab: BMP

## 2020-09-24 NOTE — PRG
DATE OF SERVICE:  09/24/2020



SUBJECTIVE:  Mr. Gomez is feeling well.  He is not having any chest pain.  He is

not short of breath.  Overall, he feels better. 



OBJECTIVE:  VITAL SIGNS:  His blood pressure is 134/100, pulse 90. 

LUNGS:  Clear. 

CARDIAC:  Normal S1, normal S2. 

ABDOMEN:  Soft, nontender. 

EXTREMITIES:  There is no edema.



ASSESSMENT:  

1. Severely depressed left ventricular function; congestive heart failure, systolic,

acute on chronic, clinically improving.  The neck veins are lower now. 

2. Ejection fraction is 20% to 25%.  I suspect cardiomyopathy.

3. Stage 3 to 4 renal failure.



PLAN:  

1. Proceed to cardiac catheterization tomorrow.  Discussed risk of stroke, heart

attack, iodine allergy, loss of blood supply to the leg or kidney, stent thrombosis,

and stent restenosis.  This was discussed yesterday as well and also discussed again

this morning.  The patient and wife understand and they wished to proceed.  A more

thorough discussion was held yesterday. 

2. We will try to minimize contrast exposure to reduce risk of renal injury.  We

will not do left ventriculogram and try to minimize the contrast to the coronaries. 

3. We will hold Lasix tomorrow.

4. Give some fluid pre-catheterization, but can only give a modest amount or else he

may go back into heart failure. 







Job ID:  [id number]

## 2020-09-25 LAB
ANION GAP SERPL CALC-SCNC: 12 MMOL/L (ref 10–20)
ANION GAP SERPL CALC-SCNC: 13 MMOL/L (ref 10–20)
BUN SERPL-MCNC: 25 MG/DL (ref 8.4–25.7)
BUN SERPL-MCNC: 25 MG/DL (ref 8.4–25.7)
CALCIUM SERPL-MCNC: 8.3 MG/DL (ref 7.8–10.44)
CALCIUM SERPL-MCNC: 8.4 MG/DL (ref 7.8–10.44)
CHLORIDE SERPL-SCNC: 108 MMOL/L (ref 98–107)
CHLORIDE SERPL-SCNC: 109 MMOL/L (ref 98–107)
CO2 SERPL-SCNC: 27 MMOL/L (ref 23–31)
CO2 SERPL-SCNC: 27 MMOL/L (ref 23–31)
CREAT CL PREDICTED SERPL C-G-VRATE: 40 ML/MIN (ref 70–130)
CREAT CL PREDICTED SERPL C-G-VRATE: 40 ML/MIN (ref 70–130)
DRUG SCREEN CUTOFF: (no result)
GLUCOSE SERPL-MCNC: 115 MG/DL (ref 80–115)
GLUCOSE SERPL-MCNC: 99 MG/DL (ref 80–115)
MEDTOX CONTROL LINE VALID?: (no result)
MEDTOX READER #: (no result)
POTASSIUM SERPL-SCNC: 3.8 MMOL/L (ref 3.5–5.1)
POTASSIUM SERPL-SCNC: 4.1 MMOL/L (ref 3.5–5.1)
SODIUM SERPL-SCNC: 144 MMOL/L (ref 136–145)
SODIUM SERPL-SCNC: 144 MMOL/L (ref 136–145)

## 2020-09-25 PROCEDURE — 4A023N7 MEASUREMENT OF CARDIAC SAMPLING AND PRESSURE, LEFT HEART, PERCUTANEOUS APPROACH: ICD-10-PCS | Performed by: INTERNAL MEDICINE

## 2020-09-25 PROCEDURE — B2111ZZ FLUOROSCOPY OF MULTIPLE CORONARY ARTERIES USING LOW OSMOLAR CONTRAST: ICD-10-PCS | Performed by: INTERNAL MEDICINE

## 2020-09-25 PROCEDURE — B2141ZZ FLUOROSCOPY OF RIGHT HEART USING LOW OSMOLAR CONTRAST: ICD-10-PCS | Performed by: INTERNAL MEDICINE

## 2020-09-25 RX ADMIN — Medication SCH ML: at 09:23

## 2020-09-25 RX ADMIN — ASPIRIN 81 MG CHEWABLE TABLET SCH MG: 81 TABLET CHEWABLE at 05:39

## 2020-09-25 RX ADMIN — Medication SCH ML: at 21:34

## 2020-09-25 RX ADMIN — NITROGLYCERIN SCH INCH: 20 OINTMENT TOPICAL at 15:14

## 2020-09-25 RX ADMIN — NITROGLYCERIN SCH: 20 OINTMENT TOPICAL at 06:21

## 2020-09-25 RX ADMIN — NITROGLYCERIN SCH INCH: 20 OINTMENT TOPICAL at 21:34

## 2020-09-25 NOTE — PDOC.HOSPP
- Subjective


Encounter Date: 09/25/20


Encounter Time: 09:55


Subjective: 


Seen and examined in bed.


from cardiac catheterization this am.


Had episodes of cough during the procedure.


At my evaluation no new complaints.


He denied any chest pain or shortness of breath.





- Objective


Vital Signs & Weight: 


                             Vital Signs (12 hours)











  Temp Pulse Resp BP Pulse Ox


 


 09/25/20 03:33  98.3 F  84  22 H  128/85  98


 


 09/25/20 01:50      95








                                     Weight











Weight                         210 lb 14.4 oz














I&O: 


                                        











 09/24/20 09/25/20 09/26/20





 06:59 06:59 06:59


 


Intake Total 268 1484 


 


Balance 268 1484 











Result Diagrams: 


                                 09/23/20 03:33





                                 09/25/20 09:59





Hospitalist ROS





- Medication


Medications: 


Active Medications











Generic Name Dose Route Start Last Admin





  Trade Name Freq  PRN Reason Stop Dose Admin


 


Aspirin  81 mg  09/23/20 09:00  09/25/20 05:39





  Aspirin Chewable 81 Mg Tab  PO   81 mg





  DAILY KETAN   Administration


 


Atorvastatin Calcium  40 mg  09/23/20 21:00  09/24/20 20:34





  Atorvastatin Calcium 40 Mg Tab  PO   40 mg





  HS KETAN   Administration


 


Carvedilol  6.25 mg  09/24/20 17:00  09/25/20 05:39





  Carvedilol 3.125 Mg Tab  PO   6.25 mg





  BID-WM KETAN   Administration


 


Nitroglycerin  0.5 inch  09/23/20 06:00  09/25/20 06:21





  Nitroglycerin 2% Ointment 1 Inch/1 Gm Packet  TOP   Not Given





  Q8HR KETAN  


 


Pantoprazole Sodium  40 mg  09/23/20 09:00  09/25/20 05:39





  Pantoprazole 40 Mg Tab  PO   40 mg





  DAILY KETAN   Administration


 


Sodium Chloride  10 ml  09/23/20 09:00  09/25/20 09:23





  Flush - Normal Saline 10 Ml Syringe  IVF   10 ml





  Q12HR KETAN   Administration














- Exam


General - other findings: Patient was seen and examined in bed.  Was a bit 

anxious.


Heart - other findings: S1-S2 present and normal.  No murmurs gallops or rubs.


Respiratory - other findings: Decreased air entry bilaterally.  No rhonchi or 

rales.


Gastrointestinal - other findings: Incisional hernia present. bowel sounds 

present.


Extremities - other findings: No edema noted





Hosp A/P





- Plan





65-year-old male patient on admission on account of heart failure with reduced 

ejection fraction of unclear etiology.


Status post cardiac catheterization.





Heart failure


Had diuresis


Currently receiving IV fluids post contrast


We will check BMP


Continue close monitoring on telemetry.  


Continue goal-directed therapy.


Will check UDS.





Acute kidney injury on CKD.


Possibly cardiorenal however BUN/creatinine ratio is concerning


No baseline creatinine on chart


Creatinine on presentation 2.6 currently 2.41


Consult nephrology.


We will monitor BMP


Currently receiving IV normal saline to prevent contrast-induced nephropathy.  


Check FE urea


Consult nephrology





Type II NST MI


Status post catheterization


Continue aspirin Lasix and Coreg.


Also on atorvastatin





Hypomagnesemia


No monitor.





Hypertension


Blood pressure control


Continue manage- carvedilol,








VTE prophylaxisHeparin held on account of procedure


Restart tomorrow.

## 2020-09-26 LAB
ANION GAP SERPL CALC-SCNC: 15 MMOL/L (ref 10–20)
BUN SERPL-MCNC: 26 MG/DL (ref 8.4–25.7)
CALCIUM SERPL-MCNC: 8.3 MG/DL (ref 7.8–10.44)
CHLORIDE SERPL-SCNC: 109 MMOL/L (ref 98–107)
CO2 SERPL-SCNC: 23 MMOL/L (ref 23–31)
CREAT CL PREDICTED SERPL C-G-VRATE: 40 ML/MIN (ref 70–130)
GLUCOSE SERPL-MCNC: 97 MG/DL (ref 80–115)
POTASSIUM SERPL-SCNC: 3.8 MMOL/L (ref 3.5–5.1)
SODIUM SERPL-SCNC: 143 MMOL/L (ref 136–145)
TROPONIN I SERPL DL<=0.01 NG/ML-MCNC: 0.07 NG/ML (ref ?–0.03)

## 2020-09-26 RX ADMIN — ALBUMIN HUMAN SCH GM: 250 SOLUTION INTRAVENOUS at 09:15

## 2020-09-26 RX ADMIN — Medication SCH ML: at 09:19

## 2020-09-26 RX ADMIN — HEPARIN SODIUM SCH UNITS: 5000 INJECTION, SOLUTION INTRAVENOUS; SUBCUTANEOUS at 09:16

## 2020-09-26 RX ADMIN — Medication SCH ML: at 21:08

## 2020-09-26 RX ADMIN — ALBUMIN HUMAN SCH GM: 250 SOLUTION INTRAVENOUS at 21:07

## 2020-09-26 RX ADMIN — ALBUMIN HUMAN SCH GM: 250 SOLUTION INTRAVENOUS at 15:31

## 2020-09-26 RX ADMIN — HEPARIN SODIUM SCH UNITS: 5000 INJECTION, SOLUTION INTRAVENOUS; SUBCUTANEOUS at 21:08

## 2020-09-26 RX ADMIN — ASPIRIN 81 MG CHEWABLE TABLET SCH MG: 81 TABLET CHEWABLE at 09:16

## 2020-09-26 RX ADMIN — NITROGLYCERIN SCH INCH: 20 OINTMENT TOPICAL at 05:25

## 2020-09-26 NOTE — CON
DATE OF CONSULTATION:  09/25/2020



HISTORY OF PRESENT ILLNESS:  Mr. Gomez is a 65-year-old black male, with known

history of chronic renal failure and admitted for shortness of breath.  He was found

to be in congestive heart failure.  He has undergone a cardiac cath, which showed no

significant coronary artery disease.  However, he has significantly decreased

ejection fraction.  A stress test has been done with this patient and it showed no

active ischemia but showed markedly decreased ejection fraction.  We have been

consulted for further management of the patient's chronic renal failure.  He was

seen in the office on September 2, 2020, at that time, he was complaining of some

leg edema.  Initially, it was felt that he may have diabetic nephropathy to explain

the nephrotic range proteinuria.  However, his history of diabetes mellitus was

obtained from previous medical records from [facility].  However on close

questioning, patient denies any history of diabetes mellitus. 



REVIEW OF SYSTEMS:  Positive for leg swelling.  Positive for abdominal swelling

secondary to hernia.  No nausea, no vomiting, decreased appetite, decreased energy

level.  Positive for mild shortness of breath.  No gross hematuria.  No dysuria.  No

urinary frequency.  No productive cough.  No fever or chills.  Positive for joint

pains.  No sore throat.  No hematochezia.  No melena. 



CURRENT MEDICATIONS:  Patient is on the following medicines:

1. Aspirin 81 mg tablet daily.

2. Atorvastatin 40 mg tablet at bedtime.

3. Carvedilol 6.25 mg p.o. b.i.d.

4. Clonidine 0.1 mg b.i.d. p.r.n.

5. Furosemide 40 mg daily.

6. Heparin 5000 units subcu b.i.d.

7. Hydralazine 25 mg p.o. t.i.d.

8. DuoNeb q.2 hours p.r.n.

9. Nitroglycerin 0.4 mg sublingual q.5 p.r.n.

10. Protonix 40 mg tablet daily.



PAST MEDICAL HISTORY:  

1. Chronic renal failure.  Initially thought could be related from diabetic

nephropathy.  However, denies history of diabetes. 

2. Status post perforated colon.

3. History of gout, history of rheumatoid arthritis.  Please note, this patient was

previously on Actos, which is more suggestive of a possibility of underlying

diabetic nephropathy. 



PAST SURGICAL HISTORY:  Status post colon resection/exploratory lap, status post

colonoscopy, recently status post cardiac cath, and status post ultrasound-guided

paracenteses. 



SOCIAL HISTORY:  Patient lives in [place], is .  He is an oilfield

worker.  No children.  Education, 12th grade.  No smoking.  Occasional alcohol.  No

IV drug use.  Status post blood transfusion. 



FAMILY HISTORY:  Positive history of ESRD in one brother, on dialysis.



ALLERGIES:  NONE.



TRAUMA:  None.



IMMUNIZATIONS:  Up to date.



HOSPITALIZATIONS:  Please see past medical history.



PHYSICAL EXAMINATION:

VITAL SIGNS:  Blood pressure is 118/86, heart rate 84, respiratory rate 20,

temperature 97.6, and O2 saturation 93%. 

GENERAL:  Patient is awake, alert, supine, comfortable, not in overt distress. 

SKIN:  Adequate turgor. 

HEENT:  He has pinkish conjunctivae.  Anicteric sclerae.  No neck mass.  No carotid

bruits.  No JVD. 

CHEST:  No deformities. 

LUNGS:  Decreased breath sounds. 

HEART:  Normal sinus rhythm.  No murmur.  No gallops.  No rubs. 

ABDOMEN:  Globular, soft, and nontender.  No masses.  Positive for a protruding

midline umbilical hernia. 

EXTREMITIES:  Positive for edema. 

NEUROLOGIC:  Awake and oriented to 3 spheres.  Moving all extremities.  No tremors.

No asterixis.  No ataxia. 



LABORATORY DATA:  Laboratories of September 4, 2020, PD fluid was somewhat hazy,

white count 238, segmenters 8%, lymphocytes 66%. 



September 25, 2020, sodium 144, potassium 3.8, chloride 108, carbon dioxide 27, BUN

25, creatinine 2.41, glucose 99, and calcium 8.3. 

September 25, 2020, creatinine 2.43.   

September 24, 2020, creatinine 2.45.   

September 23, 2020, creatinine was 2.52. September 16, 2020, creatinine 2.6.  August 26, 2020, creatinine 2.54.  June 16, 2020, creatinine 2.04. 



March 22, 2019, urinalysis showed protein 100, hyaline casts 11 to 20, 0 to 3

granular cast, rbc's 0 to 3, wbc's 0 to 3. 



September 16, 2020, urine protein was 647, urine creatinine 145, protein-creatinine

ratio noted at 4.4, suggestive of nephrotic range proteinuria. 



September 16, 2020, hepatitis B surface antigen nonreactive, hepatitis C antibody

nonreactive, SANTANA negative.  ANCA is reported as negative.  C3 is 116, C4 is 44.7. 



Serum JOSÉ-immunofixation shows IgG monoclonal protein with lambda light chain

specificity. 



Urine JOSÉ/immunofixation shows IgG monoclonal protein with lambda light chain

specificity. 



ASSESSMENT AND PLAN:  

1. Chronic renal failure.  Initially, we put the possibility of diabetic nephropathy

with this patient due to his intake of Actos and previous history of diabetes

mellitus.  However, comprehensive workup for the nephrotic range proteinuria showed

an abnormal immunoelectrophoresis and abnormal urine electrophoresis. 

2. Immunofixation showed IgG monoclonal protein with lambda light chain specificity.

 This patient might have underlying plasma cell dyscrasia and for that reason, we

will refer the patient to Hematology. 

3. Renal function in the last three days has remained stable.  There is no

indication for any emergent hemodialysis with this patient.  I would suggest to

start the patient on albumin 25 g IV q.6.  Recheck CBC, basic metabolic panel in

a.m. 

4. Congestive heart failure/cardiomyopathy, currently on Lasix.  We will add albumin

to enhance the effect of Lasix.  The plan is to have the patient a temporary

defibrillator placed. 

5. Midline umbilical hernia-this is quite large.  This patient may eventually need

to have a surgical intervention for this. 

6. Overall prognosis remains guarded.







Job ID:  [id number]

## 2020-09-26 NOTE — PRG
DATE OF SERVICE:  09/26/2020



SUBJECTIVE:  Mr. Gomez is a 65-year-old black male, who was admitted for CHF.  He

was found to have a cardiomyopathy. 



We are following up this patient for his acute kidney injury on top of his chronic

renal failure.  Initially, the patient was told to have diabetes/diabetic

nephropathy since he was on Actos on my 1st evaluation.  However, he denies having

any diabetes. 



In the interim, he has had a paracentesis done to have a diagnostic workup. 



Furthermore, during the initial evaluation, we ordered a urine protein, serum

immunoelectrophoresis.  This was later on found to be positive for monoclonal

protein.  For this reason, we will be referring the patient to Hematology Oncology.

He voices no new complaints today.  I started him on albumin to see if I could

further improve his renal function. 



The patient denies any chest pain or shortness of breath at the present time.



OBJECTIVE:  VITAL SIGNS:  Blood pressure 133/93, heart rate 86, respiratory rate 24,

temperature 98.4, O2 saturation 97% on room air. 

GENERAL:  The patient is awake, alert, comfortable, not in overt distress. 

SKIN:  Adequate turgor. 

HEENT:  Pinkish conjunctivae.  Anicteric sclerae.  No neck mass.  No carotid bruits.

 No JVD. 

CHEST:  No deformities. 

LUNGS:  Clear breath sounds. 

HEART:  Normal sinus rhythm.  No murmur.  No gallops.  No rubs. 

ABDOMEN:  Globular, soft.  Nontender.  No masses.  He has some mid-line abdominal

protruding hernia. 

EXTREMITIES:  Positive for edema.



LABORATORY DATA:  Laboratories of September 16, 2020, showed that serum and urine

immunofixation shows IgG monoclonal protein with lambda light chain specificity. 



Chemistries of September 26, 2020:  Sodium 143, potassium 3.8, chloride 109, carbon

dioxide 23, BUN 26, creatinine 2.43, glucose 97, and calcium 8.3.  White count 8.5,

hemoglobin 11.8. 



ASSESSMENT AND PLAN:  

1. Chronic renal failure/nephrotic syndrome-unclear etiology, although chronic

glomerulonephritis remains.  We are awaiting input from Hematology regarding the

abnormal electrophoresis.  I have not excluded in considering renal biopsy with this

patient.  Continue albumin infusion.  Continue judicious use of diuretics.  There is

no indication for any dialytic intervention with this patient. 

2. Nephrotic range proteinuria-abnormal electrophoresis.  Possibility of a plasma

cell dyscrasia remains with this patient. 

3. Recheck CBC, basic met in a.m.







Job ID:  [id number]

## 2020-09-26 NOTE — PDOC.HOSPP
- Subjective


Encounter Date: 09/26/20


Encounter Time: 10:59


Subjective: 


Patient in bed, no complaint


He is drowsy easily goes off to sleep be easily abusable


Had PVCs and non sustained VT over night





- Objective


Vital Signs & Weight: 


                             Vital Signs (12 hours)











  Temp Pulse Resp BP Pulse Ox


 


 09/26/20 07:42  98.4 F  86  24 H  133/93 H  97


 


 09/26/20 03:49  97.9 F  88  20  130/90  96


 


 09/25/20 23:42   87   138/100 H 








                                     Weight











Weight                         210 lb 14.4 oz














I&O: 


                                        











 09/25/20 09/26/20 09/27/20





 06:59 06:59 06:59


 


Intake Total 1484 1090 


 


Output Total  150 


 


Balance 1484 940 











Result Diagrams: 


                                 09/23/20 03:33





                                 09/26/20 03:38





Hospitalist ROS





- Medication


Medications: 


Active Medications











Generic Name Dose Route Start Last Admin





  Trade Name Freq  PRN Reason Stop Dose Admin


 


Albumin Human  25 gm  09/26/20 09:00  09/26/20 09:15





  Albumin 25% 25 Gm/100 Ml Bot  IVPB  09/27/20 03:01  25 gm





  Q6H KETAN   Administration


 


Aspirin  81 mg  09/23/20 09:00  09/26/20 09:16





  Aspirin Chewable 81 Mg Tab  PO   81 mg





  DAILY KETAN   Administration


 


Atorvastatin Calcium  40 mg  09/23/20 21:00  09/25/20 21:33





  Atorvastatin Calcium 40 Mg Tab  PO   40 mg





  HS KETAN   Administration


 


Carvedilol  6.25 mg  09/24/20 17:00  09/26/20 07:49





  Carvedilol 3.125 Mg Tab  PO   6.25 mg





  BID-WM KETAN   Administration


 


Furosemide  40 mg  09/26/20 07:30  09/26/20 07:49





  Furosemide 40 Mg Tab  PO   40 mg





  DAILY-AC KETAN   Administration


 


Heparin Sodium (Porcine)  5,000 units  09/26/20 09:00  09/26/20 09:16





  Heparin 5,000 Units/Ml Vial  SC   5,000 units





  BID KETAN   Administration


 


Hydralazine HCl  25 mg  09/25/20 15:00  09/26/20 09:16





  Hydralazine 25 Mg Tab  PO   25 mg





  TID KETAN   Administration


 


Isosorbide Dinitrate  20 mg  09/26/20 09:00  09/26/20 09:27





  Isosorbide Dinitrate 20 Mg Tab  PO   20 mg





  TID KETAN   Administration


 


Pantoprazole Sodium  40 mg  09/23/20 09:00  09/26/20 09:16





  Pantoprazole 40 Mg Tab  PO   40 mg





  DAILY KETAN   Administration


 


Sodium Chloride  10 ml  09/23/20 09:00  09/26/20 09:19





  Flush - Normal Saline 10 Ml Syringe  IVF   10 ml





  Q12HR KETAN   Administration














Hosp A/P





- Plan





65-year-old male patient on admission on account of heart failure with reduced 

ejection fraction of unclear etiology.


Status post cardiac catheterization with cardiomyopathy and Ef of 15.


He will likely get life vest in a couple of days however he has an incisional 

hernia that I would want a surgical review prior to discharge as he is a bit 

improved.


Also has proteinuria concerning for paraproteinemiaoncology consult





Acute Heart failure


Due to cardiomyopathy of unclear origin


Has paraproteins thus may be related to AA


EF on Cath is 15%


Ongoing diuresis


Continue close monitoring on telemetry.  


Continue goal-directed therapy.


Cardiology followingLifeVest prior to discharge





Acute kidney injury on CKD.


Renal function stable


No requirement for dialysis the moment


Consult nephrology





Proteinuria


Concerning for paraproteinemia


Oncology consulted.





Type II NST MI


Status post catheterization


Continue aspirin Lasix and Coreg.


Also on atorvastatin





Hypomagnesemia


No monitor.





Hypertension


Blood pressure control


Continue manage- carvedilol,





-Incisional hernia


Surgery review today








VTE prophylaxisHeparin held on account of procedure


Restart tomorrow.

## 2020-09-27 LAB
ANION GAP SERPL CALC-SCNC: 14 MMOL/L (ref 10–20)
BASOPHILS # BLD AUTO: 0.1 THOU/UL (ref 0–0.2)
BASOPHILS NFR BLD AUTO: 1 % (ref 0–1)
BUN SERPL-MCNC: 30 MG/DL (ref 8.4–25.7)
CALCIUM SERPL-MCNC: 8.6 MG/DL (ref 7.8–10.44)
CHLORIDE SERPL-SCNC: 108 MMOL/L (ref 98–107)
CO2 SERPL-SCNC: 24 MMOL/L (ref 23–31)
CREAT CL PREDICTED SERPL C-G-VRATE: 37 ML/MIN (ref 70–130)
EOSINOPHIL # BLD AUTO: 0.2 THOU/UL (ref 0–0.7)
EOSINOPHIL NFR BLD AUTO: 3.3 % (ref 0–10)
GLUCOSE SERPL-MCNC: 88 MG/DL (ref 80–115)
HGB BLD-MCNC: 10.6 G/DL (ref 14–18)
LYMPHOCYTES # BLD: 1.1 THOU/UL (ref 1.2–3.4)
LYMPHOCYTES NFR BLD AUTO: 20.2 % (ref 21–51)
MCH RBC QN AUTO: 27.6 PG (ref 27–31)
MCV RBC AUTO: 88.8 FL (ref 78–98)
MONOCYTES # BLD AUTO: 0.4 THOU/UL (ref 0.11–0.59)
MONOCYTES NFR BLD AUTO: 7.8 % (ref 0–10)
NEUTROPHILS # BLD AUTO: 3.8 THOU/UL (ref 1.4–6.5)
NEUTROPHILS NFR BLD AUTO: 67.7 % (ref 42–75)
PLATELET # BLD AUTO: 156 THOU/UL (ref 130–400)
POTASSIUM SERPL-SCNC: 3.8 MMOL/L (ref 3.5–5.1)
RBC # BLD AUTO: 3.85 MILL/UL (ref 4.7–6.1)
SODIUM SERPL-SCNC: 142 MMOL/L (ref 136–145)
WBC # BLD AUTO: 5.6 THOU/UL (ref 4.8–10.8)

## 2020-09-27 RX ADMIN — HEPARIN SODIUM SCH UNITS: 5000 INJECTION, SOLUTION INTRAVENOUS; SUBCUTANEOUS at 21:19

## 2020-09-27 RX ADMIN — Medication SCH ML: at 21:24

## 2020-09-27 RX ADMIN — ASPIRIN 81 MG CHEWABLE TABLET SCH MG: 81 TABLET CHEWABLE at 08:54

## 2020-09-27 RX ADMIN — ALBUMIN HUMAN SCH GM: 250 SOLUTION INTRAVENOUS at 03:46

## 2020-09-27 RX ADMIN — HEPARIN SODIUM SCH UNITS: 5000 INJECTION, SOLUTION INTRAVENOUS; SUBCUTANEOUS at 08:54

## 2020-09-27 RX ADMIN — Medication SCH ML: at 08:55
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
## 2020-09-27 NOTE — CON
DATE OF CONSULTATION:  09/27/2020



CHIEF COMPLAINT:  Incisional hernia.



HISTORY OF PRESENT ILLNESS:  This is a 65-year-old male with a history of

significant CHF, who is status post perforated colon treated with exploratory

laparotomy at the [facility].  He notes a prolonged healing with that surgery and

subsequent hernia.  His hernia is asymptomatic.  He denies history of incarceration.

 He has no abdominal pain.  He states that it is a nuisance more than anything else. 



PAST MEDICAL HISTORY:  Includes chronic renal failure, perforated colon, rheumatoid

arthritis, chronic renal failure, severe CHF. 



PAST SURGICAL HISTORY:  Includes exploratory laparotomy, colonoscopy, cardiac cath,

paracentesis. 



MEDICATIONS:  See list.



ALLERGIES:  NO KNOWN DRUG ALLERGIES.



SOCIAL HISTORY:  No smoking or alcohol currently.



REVIEW OF SYSTEMS:  10 system review of systems is otherwise negative unless

described above. 



PHYSICAL EXAMINATION:

VITAL SIGNS:  Blood pressure is 142/105, pulse 86, respirations 18.  He is afebrile. 

HEENT:  Sclerae are anicteric.  Oropharynx clear. 

NECK:  No lymphadenopathy. 

CHEST:  Clear. 

HEART:  Regular rate. 

ABDOMEN:  Soft, nontender.  He has large incisional hernia with loss of domain.

Well-healed midline incision. 



LABORATORY DATA:  White blood cell count is 5, hemoglobin 10.  Sodium 142,

creatinine 2.48. 



ASSESSMENT:  

1. Incisional hernia with loss of domain, not in any danger of incarceration or

strangulation. 

2. Chronic renal failure.

3. Severe congestive heart failure.



PLAN:  Mr. Gomez is not going to be a candidate for hernia repair.  He has a low

likelihood of complications of this hernia given that it is so large.  However, I

recommend follow up with me in the office setting after discharge in a month and I

can follow along chronically there.  His hernia repair would require component

separation, and given his CHF, fluid retention, chronic renal failure, would be

fraught with significant comorbidity, so no plans for surgery. 







Job ID:  [id number]

## 2020-09-27 NOTE — PDOC.HOSPP
- Subjective


Encounter Date: 09/27/20


Encounter Time: 10:00


Subjective: 


He was seen and examined in bed.


Denies any new complaints.


No chest pain or shortness of breath.


His wife was in the room with him.





- Objective


Vital Signs & Weight: 


                             Vital Signs (12 hours)











  Temp Pulse Pulse Pulse Resp BP BP


 


 09/27/20 11:59    83  85   120/77  134/65


 


 09/27/20 11:18  97.8 F  80    18  


 


 09/27/20 07:51  97.9 F  86    18  


 


 09/27/20 03:47  98.2 F  84    18  














  BP Pulse Ox Pulse Ox Pulse Ox


 


 09/27/20 11:59    98  95


 


 09/27/20 11:18  134/65  99  


 


 09/27/20 07:51  142/105 H  98  


 


 09/27/20 03:47  129/88  98  








                                     Weight











Weight                         200 lb 3 oz














I&O: 


                                        











 09/26/20 09/27/20 09/28/20





 06:59 06:59 06:59


 


Intake Total 1090 2170 


 


Output Total 150  


 


Balance 940 2170 











Result Diagrams: 


                                 09/27/20 04:21





                                 09/27/20 04:21





Hospitalist ROS





- Medication


Medications: 


Active Medications











Generic Name Dose Route Start Last Admin





  Trade Name Freq  PRN Reason Stop Dose Admin


 


Aspirin  81 mg  09/23/20 09:00  09/27/20 08:54





  Aspirin Chewable 81 Mg Tab  PO   81 mg





  DAILY KETAN   Administration


 


Atorvastatin Calcium  40 mg  09/23/20 21:00  09/26/20 21:08





  Atorvastatin Calcium 40 Mg Tab  PO   40 mg





  HS KETAN   Administration


 


Carvedilol  6.25 mg  09/24/20 17:00  09/27/20 07:58





  Carvedilol 3.125 Mg Tab  PO   6.25 mg





  BID-WM KETAN   Administration


 


Furosemide  40 mg  09/26/20 07:30  09/27/20 07:58





  Furosemide 40 Mg Tab  PO   40 mg





  DAILY-AC KETAN   Administration


 


Heparin Sodium (Porcine)  5,000 units  09/26/20 09:00  09/27/20 08:54





  Heparin 5,000 Units/Ml Vial  SC   5,000 units





  BID KETAN   Administration


 


Hydralazine HCl  25 mg  09/25/20 15:00  09/27/20 08:54





  Hydralazine 25 Mg Tab  PO   25 mg





  TID KETAN   Administration


 


Pantoprazole Sodium  40 mg  09/23/20 09:00  09/27/20 08:55





  Pantoprazole 40 Mg Tab  PO   40 mg





  DAILY KETAN   Administration


 


Sodium Chloride  10 ml  09/23/20 09:00  09/27/20 08:55





  Flush - Normal Saline 10 Ml Syringe  IVF   10 ml





  Q12HR KETAN   Administration














- Exam


General - other findings: In no acute distress.


Heart - other findings: S1-S2 present.  No murmurs gallops or rubs.


Respiratory - other findings: Entry adequate bilaterally.  No rhonchi or rales.


Gastrointestinal - other findings: Fall sounds present.  No tenderness.  Large 

incisional hernia.


Extremities - other findings: No edema noted.





Hosp A/P





- Plan





65-year-old male patient on admission on account of heart failure with reduced 

ejection fraction of unclear etiology.


Status post cardiac catheterization with cardiomyopathy and Ef of 15.


Possibly to get LifeVest today.


Also has proteinuria concerning for paraproteinemiaoncology consulted





Acute Heart failure


Due to cardiomyopathy of unclear origin


Has paraproteins thus may be related to AA


EF on Cath is 15%


Ongoing diuresis


Continue close monitoring on telemetry.  


Continue goal-directed therapy.


Likely to get LifeVest today





Acute kidney injury on CKD.


Renal function stable


No requirement for dialysis the moment


Consult nephrology





Paraproteinemia


Oncology consulted.





Type II NST MI


Status post catheterization


Continue aspirin Lasix and Coreg.


Also on atorvastatin





Hypomagnesemia


Corrected





Hypertension


Blood pressure control


Continue manage- carvedilol,





-Incisional hernia


Surgery reviewedno surgery on this admission








VTE prophylaxisHeparin held on account of procedure


Restart tomorrow.

## 2020-09-27 NOTE — RAD
EXAM:  XR Bone Survey Adult STANDARD



DATE:  9/27/2020 3:25 PM



INDICATION:  History of myeloma



COMPARISON:  None.



FINDING:  No suspicious osteolytic lesion is grossly evident involving axial and appendicular skeleto
n. There are areas of intramedullary bone infarcts involving the distal femurs bilaterally. There

is scattered osteoarthritic and degenerative change. There are scattered vascular calcifications. The
re is mild dextroscoliosis of the lumbar spine. There are degenerative subchondral cyst-like

abnormalities involving the greater tuberosities of both humeral heads with slight elevation of the h
umeral head suspicious for underlying rotator cuff insufficiency. The visualized lungs are clear.

There is moderate cardiomegaly



Gas pattern is unobstructed. There are small phleboliths within the lower left hemipelvis.





IMPRESSION:No suspicious osteolytic lesion is grossly evident within the axial or appendicular skelet
on. Other findings as above



Transcribed Date/Time: 9/27/2020 4:23 PM



Reported By: Alexx Pierce 

Electronically Signed:  10/1/2020 7:40 AM

## 2020-09-27 NOTE — PRG
DATE OF SERVICE:  09/27/2020



SERVICE:  Renal Medicine.



SUBJECTIVE:  Mr. Gomez is a 65-year-old black male, who was seen by the Renal

Service for his acute kidney injury on top of his chronic renal failure.  He most

likely has had a superimposed prerenal azotemia.  We will be giving him colloid

infusion albumin 25 g IV q.6 to stabilize the renal function.  Kidney function is

relatively unchanged.  He was also recently diagnosed to have

cardiomyopathy/decreased EF-currently on a diuretic regimen.  He has also been

evaluated by Surgery regarding his midline abdominal hernia-most likely incisional.

In addition, due to the abnormal immunofixation study showing IgG monoclonal protein

with lambda light chain specificity, he has been evaluated by Hematology.  This

morning, he voices no new complaints.  He denies any chest pain or shortness of

breath. 



OBJECTIVE:  VITAL SIGNS:  Blood pressure 142/105, heart rate 86, respiratory rate

18, temperature 97.9, O2 saturation 98%. 

GENERAL:  The patient is awake, sitting comfortable, not in overt distress. 

SKIN:  Adequate turgor. 

HEENT:  He has pinkish conjunctivae.  Anicteric sclerae. 

NECK:  No neck mass.  No carotid bruits.  No JVD. 

CHEST:  No deformities. 

LUNGS:  Clear breath sounds.  No wheezing.  No crackles. 

HEART:  Normal sinus rhythm.  No murmurs, gallops, or rubs. 

ABDOMEN:  Globular, soft, nontender.  No masses. 

EXTREMITIES:  Trace edema.



MEDICATIONS:  On September 27, 2020, were reviewed.



LABORATORY DATA:  On September 27, 2020; white count 5.6, hemoglobin 10.6.  Sodium

142, potassium 3.8, chloride 108, carbon dioxide 24, BUN 30, creatinine 2.48,

glucose 88, calcium 8.6.  Troponin I is 0.066. 



ASSESSMENT AND PLAN:  

1. Acute kidney injury/chronic renal failure, superimposed prerenal azotemia, stable

renal function.  Creatinine 2.48 is near baseline for the last few days.  GFR is 32

mL/minute.  Continue current management.  Continue judicious use of diuretics.  Will

not restart albumin for today. 

2. Abnormal protein immunoelectrophoresis-? of lambda free chains-Hematology has

been consulted. 

3. Borderline anemia.  We will continue to observe.

4. Cardiomyopathy-awaiting LifeVest placement.  Cardiology is following.

5. Recheck CBC and basic metabolic in a.m.







Job ID:  [id number]

## 2020-09-27 NOTE — PDOC.EVN
Event Note





- Event Note


Event Note: 





Consult dictated.  No plans for surgery.  I can see him in the office in follow 

up.

## 2020-09-28 VITALS — SYSTOLIC BLOOD PRESSURE: 109 MMHG | DIASTOLIC BLOOD PRESSURE: 68 MMHG | TEMPERATURE: 97.6 F

## 2020-09-28 LAB
ALBUMIN SERPL ELPH-MCNC: 3 G/DL (ref 2.9–4.4)
ALBUMIN/GLOB SERPL: 1 {RATIO} (ref 0.7–1.7)
ALPHA1 GLOB SERPL ELPH-MCNC: 0.3 G/DL (ref 0–0.4)
ALPHA2 GLOB SERPL ELPH-MCNC: 0.7 G/DL (ref 0.4–1)
ANION GAP SERPL CALC-SCNC: 14 MMOL/L (ref 10–20)
B-GLOBULIN SERPL ELPH-MCNC: 0.8 G/DL (ref 0.7–1.3)
BASOPHILS # BLD AUTO: 0.1 THOU/UL (ref 0–0.2)
BASOPHILS NFR BLD AUTO: 0.9 % (ref 0–1)
BUN SERPL-MCNC: 29 MG/DL (ref 8.4–25.7)
CALCIUM SERPL-MCNC: 8.5 MG/DL (ref 7.8–10.44)
CHLORIDE SERPL-SCNC: 108 MMOL/L (ref 98–107)
CO2 SERPL-SCNC: 23 MMOL/L (ref 23–31)
CREAT CL PREDICTED SERPL C-G-VRATE: 37 ML/MIN (ref 70–130)
EOSINOPHIL # BLD AUTO: 0.2 THOU/UL (ref 0–0.7)
EOSINOPHIL NFR BLD AUTO: 3.4 % (ref 0–10)
GAMMA GLOB SERPL ELPH-MCNC: 1.1 G/DL (ref 0.4–1.8)
GLOBULIN SER CALC-MCNC: 2.9 G/DL (ref 2.2–3.9)
GLUCOSE SERPL-MCNC: 112 MG/DL (ref 80–115)
HGB BLD-MCNC: 11 G/DL (ref 14–18)
KAPPA LC FREE/LAMBDA FREE SER: 1.38 {RATIO} (ref 0.26–1.65)
LYMPHOCYTES # BLD: 1.5 THOU/UL (ref 1.2–3.4)
LYMPHOCYTES NFR BLD AUTO: 22.4 % (ref 21–51)
M PROTEIN SERPL ELPH-MCNC: 0.1 G/DL
MCH RBC QN AUTO: 27.7 PG (ref 27–31)
MCV RBC AUTO: 87.8 FL (ref 78–98)
MONOCYTES # BLD AUTO: 0.6 THOU/UL (ref 0.11–0.59)
MONOCYTES NFR BLD AUTO: 8.6 % (ref 0–10)
NEUTROPHILS # BLD AUTO: 4.2 THOU/UL (ref 1.4–6.5)
NEUTROPHILS NFR BLD AUTO: 64.7 % (ref 42–75)
PLATELET # BLD AUTO: 161 THOU/UL (ref 130–400)
POTASSIUM SERPL-SCNC: 3.8 MMOL/L (ref 3.5–5.1)
RBC # BLD AUTO: 3.96 MILL/UL (ref 4.7–6.1)
SODIUM SERPL-SCNC: 141 MMOL/L (ref 136–145)
WBC # BLD AUTO: 6.5 THOU/UL (ref 4.8–10.8)

## 2020-09-28 RX ADMIN — Medication SCH ML: at 08:24

## 2020-09-28 RX ADMIN — HEPARIN SODIUM SCH UNITS: 5000 INJECTION, SOLUTION INTRAVENOUS; SUBCUTANEOUS at 08:24

## 2020-09-28 RX ADMIN — Medication SCH ML: at 20:45

## 2020-09-28 RX ADMIN — ASPIRIN 81 MG CHEWABLE TABLET SCH MG: 81 TABLET CHEWABLE at 08:23

## 2020-09-28 NOTE — PRG
DATE OF SERVICE:  09/28/2020



SUBJECTIVE:  Mr. Gomez is a 65-year-old black male, who is followed up by the Renal

Service for his acute kidney injury on top of his chronic renal failure.  He had a

presumptive diagnosis of prerenal azotemia on top of his chronic renal failure.  He

was also being followed up for his nephrotic range proteinuria.  During the workup,

he has been noted to have an abnormal immunoelectrophoresis since the Hematology

consult. 



The patient has also been seen by the Surgery Service for his midline incisional

hernia.  This morning, he voices no new complaints.  He denies any chest pain or

shortness of breath. 



OBJECTIVE:  VITAL SIGNS:  Blood pressure is 135/102, heart rate 87, respiratory rate

17, temperature 97.9, O2 saturation 94%. 

GENERAL:  The patient is awake, alert, comfortable, not in overt distress. 

SKIN:  Adequate turgor. 

HEENT:  He has pinkish conjunctivae.  Anicteric sclerae.  No neck mass.  No carotid

bruits.  No JVD. 

CHEST:  No deformities. 

LUNGS:  Decreased breath sounds. 

HEART:  Normal sinus rhythm.  No murmur.  No gallops.  No rubs. 

ABDOMEN:  Globular, soft, nontender.  Positive for midline hernia. 

EXTREMITIES:  Trace edema.



MEDICATIONS:  Medications of September 28, 2020, reviewed.



LABORATORY DATA:  Laboratories of September 28, 2020; white count 6.5, hemoglobin

11.  Sodium 141, potassium 3.8, chloride 108, carbon dioxide 23, BUN 29, creatinine

2.49, glucose 112, calcium 8.5. 



ASSESSMENT AND PLAN:  

1. Acute kidney injury/chronic renal failure, superimposed prerenal azotemia.

Stable renal function.  No indication for any dialytic intervention. 

2. Continue supportive care.

3. Abnormal JOSÉ-Hematology has evaluated this patient and the feeling this is most

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  Bone skeletal survey showed no suspicious osteolytic lesion. 

4. Midline incisional abdominal hernia-surgery is following.

5. Cardiomyopathy-for LifeVest placement.

6. Recheck CBC, basic met a.m.







Job ID:  [id number]

## 2020-09-28 NOTE — PRG
DATE OF SERVICE:  09/28/2020



SUBJECTIVE:  Mr. Gomez says he is feeling fine.  He is not having shortness of

breath.  No chest pain. 



OBJECTIVE:  VITAL SIGNS:  Blood pressure 135/102, pulse is in the high 80s. 

LUNGS:  Clear. 

CARDIAC:  Normal S1, normal S2. 

ABDOMEN:  Soft, nontender.



ASSESSMENT:  

1. Cardiomyopathy, severe.

2. Renal failure, suspect stage 5, with a creatinine of 2.49.  It is varying between

3 and 4. 



PLAN:  

1. He is on carvedilol.  We will increase dose to 12.5 mg twice a day.

2. Isosorbide and hydralazine.

3. We are awaiting a LifeVest.

4. Aspirin and atorvastatin.

5. He has mild coronary artery disease.  We will also treat __________.

6. Cannot tolerate ACE inhibitors or ARB currently due to renal failure, cannot

tolerate spironolactone due to renal failure. 







Job ID:  [id number]

## 2020-09-29 NOTE — DIS
DATE OF ADMISSION:  09/22/2020



DATE OF DISCHARGE:  09/28/2020



DISCHARGE DIAGNOSES:  

1. Acute on chronic heart failure.

2. Acute kidney injury on chronic kidney disease.

3. Paraproteinemia.

4. Type 2 non-ST-elevation myocardial infarction.

5. Hypomagnesemia.

6. Hypertension.

7. Incisional hernia.



BRIEF HOSPITAL COURSE:  This is a 65-year-old male patient, admitted on account of

acute heart failure with reduced ejection fraction.  Etiology was unclear. 



Cardiology was consulted and he had a cardiac catheterization, revealing

cardiomyopathy with EF of about 15%.  The patient was managed on diuresis with

Lasix, carvedilol, aspirin, atorvastatin, and he was discharged with LifeVest. 



He will follow with Cardiology for further evaluation. 



On presentation, due to his chronic kidney disease, the nephrologist was consulted.

It was noted that he had proteinemia, which was evaluated and noted to have

paraproteins. 



Oncology was consulted to evaluate the paraproteins, and after initial evaluation,

he was asked to follow up for further evaluation in the clinic. 



At presentation to the hospital, he had a big anterior abdominal incisional hernia,

which may require repair. 



Surgicalist was consulted, who opted not to do immediate repair, but would evaluate

the patient on followup. 



DISCHARGE PHYSICAL EXAMINATION:  GENERAL:  The patient is in bed, in no distress. 

CARDIOVASCULAR SYSTEM:  S1 and S2 present and normal.  No murmurs, gallops, or rubs. 

RESPIRATORY SYSTEM:  Air entry adequate bilaterally. 

ABDOMEN:  Incisional hernia present.  No tenderness or guarding.  Bowel sounds

present. 

EXTREMITIES:  No edema noted.



CONSULTANTS:  

1. Cardiology, Dr. Guillory.

2. Oncology, Dr. Estrada.

3. General Surgery, Dr. Montalvo.



DISCHARGE CONDITION:  Stable.







Job ID:  [id number]

## 2020-10-01 NOTE — STRESS
Acquisition Time: 2020-09-23  11:41:33

Total Exercise Time: 00:04:00

Test Indications: CHF

Medications: 

 

 

 

 

 

 

 

 

 

Protocol: ADENOSINE

Max HR: 098 BPM  64% of  Pred: 153 BPM

Max BP: 170/098 mmHG

Max Work Load: 1.0 METS

 

RESTING ECG: NORMAL SINUS RHYTHM AT 96 BPM, LEFT AXIS DEVIATION, FIRST DEGREE AV

 BLOCK, PREVIOUS ANTERIOR INFARCTION AND OCCASIONAL PVC. 

SYMPTOMS: NONE

NORMAL BP RESPONSE

ECTOPY: NONE

ECG STRESS: NO SIGNIFICANT CHANGES

INTERPRETATION: NEGATIVE ECG/AWAIT NUCLEAR IMAGES FOR DEFINITIVE DIAGNOSIS

 

Confirmed by DR. JAIRO MEYER ([id number]),  TITA SALAZAR ([id number]) on 10/1/2020 2:49:13 PM

 

Referred By: MD BRONSON HIRSCH           Confirmed By:DR. JAIRO MEYER

## 2022-12-29 ENCOUNTER — HOSPITAL ENCOUNTER (EMERGENCY)
Dept: HOSPITAL 92 - ERS | Age: 67
Discharge: HOME | End: 2022-12-29
Payer: MEDICARE

## 2022-12-29 DIAGNOSIS — K21.9: ICD-10-CM

## 2022-12-29 DIAGNOSIS — I10: ICD-10-CM

## 2022-12-29 DIAGNOSIS — K04.7: Primary | ICD-10-CM

## 2022-12-29 PROCEDURE — 93005 ELECTROCARDIOGRAM TRACING: CPT

## 2023-02-03 ENCOUNTER — HOSPITAL ENCOUNTER (INPATIENT)
Dept: HOSPITAL 92 - ERS | Age: 68
LOS: 34 days | Discharge: NURSING, REHAB OR LONG-TERM CARE | DRG: 4 | End: 2023-03-09
Attending: FAMILY MEDICINE | Admitting: INTERNAL MEDICINE
Payer: COMMERCIAL

## 2023-02-03 VITALS — BODY MASS INDEX: 27.7 KG/M2

## 2023-02-03 DIAGNOSIS — Z79.82: ICD-10-CM

## 2023-02-03 DIAGNOSIS — Z98.890: ICD-10-CM

## 2023-02-03 DIAGNOSIS — I25.10: ICD-10-CM

## 2023-02-03 DIAGNOSIS — E11.22: ICD-10-CM

## 2023-02-03 DIAGNOSIS — K21.9: ICD-10-CM

## 2023-02-03 DIAGNOSIS — D63.1: ICD-10-CM

## 2023-02-03 DIAGNOSIS — J96.01: ICD-10-CM

## 2023-02-03 DIAGNOSIS — K43.9: ICD-10-CM

## 2023-02-03 DIAGNOSIS — Z79.899: ICD-10-CM

## 2023-02-03 DIAGNOSIS — I63.9: ICD-10-CM

## 2023-02-03 DIAGNOSIS — E87.1: ICD-10-CM

## 2023-02-03 DIAGNOSIS — G93.41: ICD-10-CM

## 2023-02-03 DIAGNOSIS — J69.0: ICD-10-CM

## 2023-02-03 DIAGNOSIS — I12.0: ICD-10-CM

## 2023-02-03 DIAGNOSIS — Z95.810: ICD-10-CM

## 2023-02-03 DIAGNOSIS — E66.9: ICD-10-CM

## 2023-02-03 DIAGNOSIS — E87.20: ICD-10-CM

## 2023-02-03 DIAGNOSIS — E83.42: ICD-10-CM

## 2023-02-03 DIAGNOSIS — I42.8: ICD-10-CM

## 2023-02-03 DIAGNOSIS — N17.9: Primary | ICD-10-CM

## 2023-02-03 DIAGNOSIS — M10.9: ICD-10-CM

## 2023-02-03 DIAGNOSIS — I27.20: ICD-10-CM

## 2023-02-03 DIAGNOSIS — Z20.822: ICD-10-CM

## 2023-02-03 DIAGNOSIS — G81.94: ICD-10-CM

## 2023-02-03 DIAGNOSIS — I50.23: ICD-10-CM

## 2023-02-03 DIAGNOSIS — E83.39: ICD-10-CM

## 2023-02-03 DIAGNOSIS — E46: ICD-10-CM

## 2023-02-03 DIAGNOSIS — R13.10: ICD-10-CM

## 2023-02-03 DIAGNOSIS — N18.6: ICD-10-CM

## 2023-02-03 LAB
ALBUMIN SERPL BCG-MCNC: 2.4 G/DL (ref 3.4–4.8)
ALP SERPL-CCNC: 83 U/L (ref 40–110)
ALT SERPL W P-5'-P-CCNC: 10 U/L (ref 8–55)
ANION GAP SERPL CALC-SCNC: 13 MMOL/L (ref 10–20)
AST SERPL-CCNC: 12 U/L (ref 5–34)
BASOPHILS # BLD AUTO: 0 THOU/UL (ref 0–0.2)
BASOPHILS NFR BLD AUTO: 0.2 % (ref 0–1)
BILIRUB SERPL-MCNC: 0.2 MG/DL (ref 0.2–1.2)
BUN SERPL-MCNC: 71 MG/DL (ref 8.4–25.7)
CALCIUM SERPL-MCNC: 7.7 MG/DL (ref 7.8–10.44)
CHLORIDE SERPL-SCNC: 112 MMOL/L (ref 98–107)
CO2 SERPL-SCNC: 18 MMOL/L (ref 23–31)
CREAT CL PREDICTED SERPL C-G-VRATE: 0 ML/MIN (ref 70–130)
EOSINOPHIL # BLD AUTO: 0.3 THOU/UL (ref 0–0.7)
EOSINOPHIL NFR BLD AUTO: 4.1 % (ref 0–10)
GLOBULIN SER CALC-MCNC: 2.7 G/DL (ref 2.4–3.5)
GLUCOSE SERPL-MCNC: 97 MG/DL (ref 80–115)
HGB BLD-MCNC: 10.1 G/DL (ref 14–18)
LYMPHOCYTES # BLD: 0.9 THOU/UL (ref 1.2–3.4)
LYMPHOCYTES NFR BLD AUTO: 14.4 % (ref 21–51)
MAGNESIUM SERPL-MCNC: 1.4 MG/DL (ref 1.6–2.6)
MCH RBC QN AUTO: 25.4 PG (ref 27–31)
MCV RBC AUTO: 82.1 FL (ref 78–98)
MONOCYTES # BLD AUTO: 0.6 THOU/UL (ref 0.11–0.59)
MONOCYTES NFR BLD AUTO: 9.5 % (ref 0–10)
NEUTROPHILS # BLD AUTO: 4.5 THOU/UL (ref 1.4–6.5)
NEUTROPHILS NFR BLD AUTO: 71.9 % (ref 42–75)
PLATELET # BLD AUTO: 190 10X3/UL (ref 130–400)
POTASSIUM SERPL-SCNC: 4.2 MMOL/L (ref 3.5–5.1)
RBC # BLD AUTO: 3.97 MILL/UL (ref 4.7–6.1)
SODIUM SERPL-SCNC: 139 MMOL/L (ref 136–145)
WBC # BLD AUTO: 6.3 10X3/UL (ref 4.8–10.8)

## 2023-02-03 PROCEDURE — 95819 EEG AWAKE AND ASLEEP: CPT

## 2023-02-03 PROCEDURE — 84100 ASSAY OF PHOSPHORUS: CPT

## 2023-02-03 PROCEDURE — 90935 HEMODIALYSIS ONE EVALUATION: CPT

## 2023-02-03 PROCEDURE — 94150 VITAL CAPACITY TEST: CPT

## 2023-02-03 PROCEDURE — 93306 TTE W/DOPPLER COMPLETE: CPT

## 2023-02-03 PROCEDURE — 94760 N-INVAS EAR/PLS OXIMETRY 1: CPT

## 2023-02-03 PROCEDURE — 84484 ASSAY OF TROPONIN QUANT: CPT

## 2023-02-03 PROCEDURE — 87340 HEPATITIS B SURFACE AG IA: CPT

## 2023-02-03 PROCEDURE — 80061 LIPID PANEL: CPT

## 2023-02-03 PROCEDURE — 85610 PROTHROMBIN TIME: CPT

## 2023-02-03 PROCEDURE — P9016 RBC LEUKOCYTES REDUCED: HCPCS

## 2023-02-03 PROCEDURE — 74018 RADEX ABDOMEN 1 VIEW: CPT

## 2023-02-03 PROCEDURE — 80048 BASIC METABOLIC PNL TOTAL CA: CPT

## 2023-02-03 PROCEDURE — P9047 ALBUMIN (HUMAN), 25%, 50ML: HCPCS

## 2023-02-03 PROCEDURE — 83735 ASSAY OF MAGNESIUM: CPT

## 2023-02-03 PROCEDURE — 70450 CT HEAD/BRAIN W/O DYE: CPT

## 2023-02-03 PROCEDURE — 82553 CREATINE MB FRACTION: CPT

## 2023-02-03 PROCEDURE — 95712 VEEG 2-12 HR INTMT MNTR: CPT

## 2023-02-03 PROCEDURE — 86706 HEP B SURFACE ANTIBODY: CPT

## 2023-02-03 PROCEDURE — 85025 COMPLETE CBC W/AUTO DIFF WBC: CPT

## 2023-02-03 PROCEDURE — 83880 ASSAY OF NATRIURETIC PEPTIDE: CPT

## 2023-02-03 PROCEDURE — 71045 X-RAY EXAM CHEST 1 VIEW: CPT

## 2023-02-03 PROCEDURE — 96360 HYDRATION IV INFUSION INIT: CPT

## 2023-02-03 PROCEDURE — 82728 ASSAY OF FERRITIN: CPT

## 2023-02-03 PROCEDURE — 82805 BLOOD GASES W/O2 SATURATION: CPT

## 2023-02-03 PROCEDURE — 86901 BLOOD TYPING SEROLOGIC RH(D): CPT

## 2023-02-03 PROCEDURE — 86850 RBC ANTIBODY SCREEN: CPT

## 2023-02-03 PROCEDURE — 36430 TRANSFUSION BLD/BLD COMPNT: CPT

## 2023-02-03 PROCEDURE — 86704 HEP B CORE ANTIBODY TOTAL: CPT

## 2023-02-03 PROCEDURE — 36415 COLL VENOUS BLD VENIPUNCTURE: CPT

## 2023-02-03 PROCEDURE — 86900 BLOOD TYPING SEROLOGIC ABO: CPT

## 2023-02-03 PROCEDURE — C1751 CATH, INF, PER/CENT/MIDLINE: HCPCS

## 2023-02-03 PROCEDURE — 84134 ASSAY OF PREALBUMIN: CPT

## 2023-02-03 PROCEDURE — 80053 COMPREHEN METABOLIC PANEL: CPT

## 2023-02-03 PROCEDURE — 87040 BLOOD CULTURE FOR BACTERIA: CPT

## 2023-02-03 PROCEDURE — 94640 AIRWAY INHALATION TREATMENT: CPT

## 2023-02-03 PROCEDURE — 94003 VENT MGMT INPAT SUBQ DAY: CPT

## 2023-02-03 PROCEDURE — C1752 CATH,HEMODIALYSIS,SHORT-TERM: HCPCS

## 2023-02-03 PROCEDURE — 93005 ELECTROCARDIOGRAM TRACING: CPT

## 2023-02-03 PROCEDURE — 87070 CULTURE OTHR SPECIMN AEROBIC: CPT

## 2023-02-03 PROCEDURE — U0003 INFECTIOUS AGENT DETECTION BY NUCLEIC ACID (DNA OR RNA); SEVERE ACUTE RESPIRATORY SYNDROME CORONAVIRUS 2 (SARS-COV-2) (CORONAVIRUS DISEASE [COVID-19]), AMPLIFIED PROBE TECHNIQUE, MAKING USE OF HIGH THROUGHPUT TECHNOLOGIES AS DESCRIBED BY CMS-2020-01-R: HCPCS

## 2023-02-03 PROCEDURE — 83550 IRON BINDING TEST: CPT

## 2023-02-03 PROCEDURE — 87811 SARS-COV-2 COVID19 W/OPTIC: CPT

## 2023-02-03 PROCEDURE — 94002 VENT MGMT INPAT INIT DAY: CPT

## 2023-02-03 PROCEDURE — C9113 INJ PANTOPRAZOLE SODIUM, VIA: HCPCS

## 2023-02-03 PROCEDURE — 83540 ASSAY OF IRON: CPT

## 2023-02-03 PROCEDURE — 95957 EEG DIGITAL ANALYSIS: CPT

## 2023-02-03 PROCEDURE — 36416 COLLJ CAPILLARY BLOOD SPEC: CPT

## 2023-02-03 PROCEDURE — U0005 INFEC AGEN DETEC AMPLI PROBE: HCPCS

## 2023-02-03 PROCEDURE — 82040 ASSAY OF SERUM ALBUMIN: CPT

## 2023-02-03 PROCEDURE — 85730 THROMBOPLASTIN TIME PARTIAL: CPT

## 2023-02-03 PROCEDURE — 80202 ASSAY OF VANCOMYCIN: CPT

## 2023-02-03 PROCEDURE — 82550 ASSAY OF CK (CPK): CPT

## 2023-02-03 PROCEDURE — 84145 PROCALCITONIN (PCT): CPT

## 2023-02-03 PROCEDURE — G0257 UNSCHED DIALYSIS ESRD PT HOS: HCPCS

## 2023-02-03 PROCEDURE — 87205 SMEAR GRAM STAIN: CPT

## 2023-02-03 PROCEDURE — 36600 WITHDRAWAL OF ARTERIAL BLOOD: CPT

## 2023-02-03 PROCEDURE — 70498 CT ANGIOGRAPHY NECK: CPT

## 2023-02-03 PROCEDURE — 70496 CT ANGIOGRAPHY HEAD: CPT

## 2023-02-03 RX ADMIN — HEPARIN SODIUM SCH UNITS: 5000 INJECTION, SOLUTION INTRAVENOUS; SUBCUTANEOUS at 22:29

## 2023-02-04 LAB
ANION GAP SERPL CALC-SCNC: 14 MMOL/L (ref 10–20)
BASOPHILS # BLD AUTO: 0 THOU/UL (ref 0–0.2)
BASOPHILS NFR BLD AUTO: 0.8 % (ref 0–1)
BUN SERPL-MCNC: 71 MG/DL (ref 8.4–25.7)
CALCIUM SERPL-MCNC: 7.6 MG/DL (ref 7.8–10.44)
CHLORIDE SERPL-SCNC: 113 MMOL/L (ref 98–107)
CO2 SERPL-SCNC: 18 MMOL/L (ref 23–31)
CREAT CL PREDICTED SERPL C-G-VRATE: 12 ML/MIN (ref 70–130)
EOSINOPHIL # BLD AUTO: 0.3 THOU/UL (ref 0–0.7)
EOSINOPHIL NFR BLD AUTO: 5.2 % (ref 0–10)
GLUCOSE SERPL-MCNC: 93 MG/DL (ref 80–115)
HGB BLD-MCNC: 9.4 G/DL (ref 14–18)
LYMPHOCYTES # BLD: 1.1 THOU/UL (ref 1.2–3.4)
LYMPHOCYTES NFR BLD AUTO: 17 % (ref 21–51)
MAGNESIUM SERPL-MCNC: 1.7 MG/DL (ref 1.6–2.6)
MCH RBC QN AUTO: 25.5 PG (ref 27–31)
MCV RBC AUTO: 82.7 FL (ref 78–98)
MONOCYTES # BLD AUTO: 0.7 THOU/UL (ref 0.11–0.59)
MONOCYTES NFR BLD AUTO: 10.4 % (ref 0–10)
NEUTROPHILS # BLD AUTO: 4.2 THOU/UL (ref 1.4–6.5)
NEUTROPHILS NFR BLD AUTO: 66.6 % (ref 42–75)
PLATELET # BLD AUTO: 191 10X3/UL (ref 130–400)
POTASSIUM SERPL-SCNC: 3.9 MMOL/L (ref 3.5–5.1)
RBC # BLD AUTO: 3.69 MILL/UL (ref 4.7–6.1)
SODIUM SERPL-SCNC: 141 MMOL/L (ref 136–145)
WBC # BLD AUTO: 6.3 10X3/UL (ref 4.8–10.8)

## 2023-02-04 RX ADMIN — HEPARIN SODIUM SCH UNITS: 5000 INJECTION, SOLUTION INTRAVENOUS; SUBCUTANEOUS at 08:35

## 2023-02-04 RX ADMIN — SODIUM BICARBONATE SCH MG: 325 TABLET ORAL at 15:03

## 2023-02-04 RX ADMIN — Medication SCH: at 20:59

## 2023-02-04 RX ADMIN — SODIUM BICARBONATE SCH MG: 325 TABLET ORAL at 20:57

## 2023-02-04 RX ADMIN — EPOETIN ALFA-EPBX SCH UNIT: 10000 INJECTION, SOLUTION INTRAVENOUS; SUBCUTANEOUS at 13:01

## 2023-02-04 RX ADMIN — HEPARIN SODIUM SCH UNITS: 5000 INJECTION, SOLUTION INTRAVENOUS; SUBCUTANEOUS at 20:57

## 2023-02-05 LAB
ALBUMIN SERPL BCG-MCNC: 2.5 G/DL (ref 3.4–4.8)
ALP SERPL-CCNC: 77 U/L (ref 40–110)
ALT SERPL W P-5'-P-CCNC: 10 U/L (ref 8–55)
ANALYZER IN CARDIO: (no result)
ANION GAP SERPL CALC-SCNC: 14 MMOL/L (ref 10–20)
ANION GAP SERPL CALC-SCNC: 16 MMOL/L (ref 10–20)
ANISOCYTOSIS BLD QL SMEAR: (no result) (100X)
APTT PPP: 36.4 SEC (ref 22.9–36.1)
AST SERPL-CCNC: 13 U/L (ref 5–34)
BASE EXCESS STD BLDA CALC-SCNC: -8.8 MEQ/L
BASOPHILS # BLD AUTO: 0 THOU/UL (ref 0–0.2)
BASOPHILS # BLD AUTO: 0 THOU/UL (ref 0–0.2)
BASOPHILS NFR BLD AUTO: 0.1 % (ref 0–1)
BASOPHILS NFR BLD AUTO: 0.7 % (ref 0–1)
BILIRUB SERPL-MCNC: 0.4 MG/DL (ref 0.2–1.2)
BUN SERPL-MCNC: 64 MG/DL (ref 8.4–25.7)
BUN SERPL-MCNC: 65 MG/DL (ref 8.4–25.7)
CA-I BLDA-SCNC: 1.11 MMOL/L (ref 1.12–1.3)
CALCIUM SERPL-MCNC: 7.6 MG/DL (ref 7.8–10.44)
CALCIUM SERPL-MCNC: 7.8 MG/DL (ref 7.8–10.44)
CHLORIDE SERPL-SCNC: 113 MMOL/L (ref 98–107)
CHLORIDE SERPL-SCNC: 114 MMOL/L (ref 98–107)
CK MB SERPL-MCNC: 8.8 NG/ML (ref 0–6.6)
CK SERPL-CCNC: 164 U/L (ref 30–200)
CO2 SERPL-SCNC: 13 MMOL/L (ref 23–31)
CO2 SERPL-SCNC: 15 MMOL/L (ref 23–31)
CREAT CL PREDICTED SERPL C-G-VRATE: 13 ML/MIN (ref 70–130)
CREAT CL PREDICTED SERPL C-G-VRATE: 13 ML/MIN (ref 70–130)
EOSINOPHIL # BLD AUTO: 0.3 THOU/UL (ref 0–0.7)
EOSINOPHIL # BLD AUTO: 0.4 THOU/UL (ref 0–0.7)
EOSINOPHIL NFR BLD AUTO: 6.2 % (ref 0–10)
EOSINOPHIL NFR BLD AUTO: 6.7 % (ref 0–10)
GLOBULIN SER CALC-MCNC: 2.5 G/DL (ref 2.4–3.5)
GLUCOSE SERPL-MCNC: 85 MG/DL (ref 80–115)
GLUCOSE SERPL-MCNC: 92 MG/DL (ref 80–115)
HCO3 BLDA-SCNC: 16 MEQ/L (ref 22–28)
HCT VFR BLDA CALC: 30 % (ref 42–52)
HGB BLD-MCNC: 10 G/DL (ref 14–18)
HGB BLD-MCNC: 9.2 G/DL (ref 14–18)
HGB BLDA-MCNC: 10.3 G/DL (ref 14–18)
INR PPP: 1.1
LYMPHOCYTES # BLD: 0.9 THOU/UL (ref 1.2–3.4)
LYMPHOCYTES # BLD: 1.2 THOU/UL (ref 1.2–3.4)
LYMPHOCYTES NFR BLD AUTO: 17.9 % (ref 21–51)
LYMPHOCYTES NFR BLD AUTO: 19.5 % (ref 21–51)
MAGNESIUM SERPL-MCNC: 1.8 MG/DL (ref 1.6–2.6)
MCH RBC QN AUTO: 24.5 PG (ref 27–31)
MCH RBC QN AUTO: 25.6 PG (ref 27–31)
MCV RBC AUTO: 83.3 FL (ref 78–98)
MCV RBC AUTO: 83.3 FL (ref 78–98)
MDIFF COMPLETE?: YES
MONOCYTES # BLD AUTO: 0.4 THOU/UL (ref 0.11–0.59)
MONOCYTES # BLD AUTO: 0.6 THOU/UL (ref 0.11–0.59)
MONOCYTES NFR BLD AUTO: 8.6 % (ref 0–10)
MONOCYTES NFR BLD AUTO: 9.7 % (ref 0–10)
NEUTROPHILS # BLD AUTO: 3.4 THOU/UL (ref 1.4–6.5)
NEUTROPHILS # BLD AUTO: 3.8 THOU/UL (ref 1.4–6.5)
NEUTROPHILS NFR BLD AUTO: 64 % (ref 42–75)
NEUTROPHILS NFR BLD AUTO: 66.7 % (ref 42–75)
O2 A-A PPRESDIFF RESPIRATORY: 246.32 MMHG (ref 0–20)
PCO2 BLDA: 30.7 MMHG (ref 35–45)
PH BLDA: 7.33 [PH] (ref 7.35–7.45)
PLATELET # BLD AUTO: 178 10X3/UL (ref 130–400)
PLATELET # BLD AUTO: 223 10X3/UL (ref 130–400)
PO2 BLDA: 428.3 MMHG (ref 80–?)
POTASSIUM BLD-SCNC: 3.91 MMOL/L (ref 3.7–5.3)
POTASSIUM SERPL-SCNC: 4.2 MMOL/L (ref 3.5–5.1)
POTASSIUM SERPL-SCNC: 4.3 MMOL/L (ref 3.5–5.1)
PROTHROMBIN TIME: 14.4 SEC (ref 12–14.7)
RBC # BLD AUTO: 3.59 MILL/UL (ref 4.7–6.1)
RBC # BLD AUTO: 4.09 MILL/UL (ref 4.7–6.1)
SODIUM SERPL-SCNC: 138 MMOL/L (ref 136–145)
SODIUM SERPL-SCNC: 139 MMOL/L (ref 136–145)
SPECIMEN DRAWN FROM PATIENT: (no result)
WBC # BLD AUTO: 5.1 10X3/UL (ref 4.8–10.8)
WBC # BLD AUTO: 6 10X3/UL (ref 4.8–10.8)

## 2023-02-05 PROCEDURE — 0BH17EZ INSERTION OF ENDOTRACHEAL AIRWAY INTO TRACHEA, VIA NATURAL OR ARTIFICIAL OPENING: ICD-10-PCS | Performed by: INTERNAL MEDICINE

## 2023-02-05 PROCEDURE — 5A1955Z RESPIRATORY VENTILATION, GREATER THAN 96 CONSECUTIVE HOURS: ICD-10-PCS | Performed by: INTERNAL MEDICINE

## 2023-02-05 PROCEDURE — 3E03317 INTRODUCTION OF OTHER THROMBOLYTIC INTO PERIPHERAL VEIN, PERCUTANEOUS APPROACH: ICD-10-PCS | Performed by: INTERNAL MEDICINE

## 2023-02-05 PROCEDURE — 4A133R1 MONITORING OF ARTERIAL SATURATION, PERIPHERAL, PERCUTANEOUS APPROACH: ICD-10-PCS | Performed by: INTERNAL MEDICINE

## 2023-02-05 RX ADMIN — SODIUM BICARBONATE SCH: 325 TABLET ORAL at 21:30

## 2023-02-05 RX ADMIN — Medication SCH ML: at 21:31

## 2023-02-05 RX ADMIN — SODIUM BICARBONATE SCH: 325 TABLET ORAL at 15:00

## 2023-02-05 RX ADMIN — HEPARIN SODIUM SCH UNITS: 5000 INJECTION, SOLUTION INTRAVENOUS; SUBCUTANEOUS at 08:54

## 2023-02-05 RX ADMIN — Medication SCH ML: at 10:52

## 2023-02-05 RX ADMIN — SODIUM BICARBONATE SCH MG: 325 TABLET ORAL at 08:52

## 2023-02-06 LAB
ANION GAP SERPL CALC-SCNC: 15 MMOL/L (ref 10–20)
BASOPHILS # BLD AUTO: 0 THOU/UL (ref 0–0.2)
BASOPHILS NFR BLD AUTO: 0.1 % (ref 0–1)
BUN SERPL-MCNC: 60 MG/DL (ref 8.4–25.7)
CALCIUM SERPL-MCNC: 7.9 MG/DL (ref 7.8–10.44)
CHLORIDE SERPL-SCNC: 115 MMOL/L (ref 98–107)
CO2 SERPL-SCNC: 15 MMOL/L (ref 23–31)
CREAT CL PREDICTED SERPL C-G-VRATE: 14 ML/MIN (ref 70–130)
EOSINOPHIL # BLD AUTO: 0.2 THOU/UL (ref 0–0.7)
EOSINOPHIL NFR BLD AUTO: 3.6 % (ref 0–10)
GLUCOSE SERPL-MCNC: 73 MG/DL (ref 80–115)
HGB BLD-MCNC: 10.1 G/DL (ref 14–18)
LYMPHOCYTES # BLD: 0.8 THOU/UL (ref 1.2–3.4)
LYMPHOCYTES NFR BLD AUTO: 11.4 % (ref 21–51)
MCH RBC QN AUTO: 25.2 PG (ref 27–31)
MCV RBC AUTO: 81.6 FL (ref 78–98)
MONOCYTES # BLD AUTO: 0.8 THOU/UL (ref 0.11–0.59)
MONOCYTES NFR BLD AUTO: 10.7 % (ref 0–10)
NEUTROPHILS # BLD AUTO: 5.2 THOU/UL (ref 1.4–6.5)
NEUTROPHILS NFR BLD AUTO: 74.2 % (ref 42–75)
PLATELET # BLD AUTO: 180 10X3/UL (ref 130–400)
POTASSIUM SERPL-SCNC: 4.2 MMOL/L (ref 3.5–5.1)
RBC # BLD AUTO: 4.02 MILL/UL (ref 4.7–6.1)
SODIUM SERPL-SCNC: 141 MMOL/L (ref 136–145)
WBC # BLD AUTO: 7 10X3/UL (ref 4.8–10.8)

## 2023-02-06 RX ADMIN — Medication SCH ML: at 07:40

## 2023-02-06 RX ADMIN — SODIUM BICARBONATE SCH MLS: 84 INJECTION, SOLUTION INTRAVENOUS at 10:29

## 2023-02-06 RX ADMIN — SODIUM BICARBONATE SCH: 325 TABLET ORAL at 21:08

## 2023-02-06 RX ADMIN — SODIUM BICARBONATE SCH: 325 TABLET ORAL at 11:51

## 2023-02-06 RX ADMIN — SODIUM BICARBONATE SCH: 325 TABLET ORAL at 07:40

## 2023-02-06 RX ADMIN — HEPARIN SODIUM SCH UNITS: 5000 INJECTION, SOLUTION INTRAVENOUS; SUBCUTANEOUS at 15:14

## 2023-02-06 RX ADMIN — Medication SCH ML: at 20:54

## 2023-02-06 RX ADMIN — HEPARIN SODIUM SCH UNITS: 5000 INJECTION, SOLUTION INTRAVENOUS; SUBCUTANEOUS at 21:38

## 2023-02-07 LAB
ALBUMIN SERPL BCG-MCNC: 1.9 G/DL (ref 3.4–4.8)
ALP SERPL-CCNC: 85 U/L (ref 40–110)
ALT SERPL W P-5'-P-CCNC: 8 U/L (ref 8–55)
ANION GAP SERPL CALC-SCNC: 16 MMOL/L (ref 10–20)
AST SERPL-CCNC: 12 U/L (ref 5–34)
BASOPHILS # BLD AUTO: 0 THOU/UL (ref 0–0.2)
BASOPHILS NFR BLD AUTO: 0.4 % (ref 0–1)
BILIRUB SERPL-MCNC: 0.3 MG/DL (ref 0.2–1.2)
BUN SERPL-MCNC: 59 MG/DL (ref 8.4–25.7)
CALCIUM SERPL-MCNC: 8.2 MG/DL (ref 7.8–10.44)
CHLORIDE SERPL-SCNC: 112 MMOL/L (ref 98–107)
CO2 SERPL-SCNC: 16 MMOL/L (ref 23–31)
CREAT CL PREDICTED SERPL C-G-VRATE: 13 ML/MIN (ref 70–130)
EOSINOPHIL # BLD AUTO: 0.1 THOU/UL (ref 0–0.7)
EOSINOPHIL NFR BLD AUTO: 1.1 % (ref 0–10)
GLOBULIN SER CALC-MCNC: 3.1 G/DL (ref 2.4–3.5)
GLUCOSE SERPL-MCNC: 141 MG/DL (ref 80–115)
HBSAG INDEX: 0.26 S/CO (ref 0–0.99)
HBV SURFACE AB SERPL IA-ACNC: (no result) MIU/ML
HEP B CORE TOTAL INDEX: 0.07 S/CO (ref 0–0.79)
HEP C INDEX: 0.28 S/CO (ref 0–0.79)
HGB BLD-MCNC: 12 G/DL (ref 14–18)
LYMPHOCYTES # BLD: 0.9 THOU/UL (ref 1.2–3.4)
LYMPHOCYTES NFR BLD AUTO: 9.5 % (ref 21–51)
MCH RBC QN AUTO: 25.4 PG (ref 27–31)
MCV RBC AUTO: 81.9 FL (ref 78–98)
MONOCYTES # BLD AUTO: 1 THOU/UL (ref 0.11–0.59)
MONOCYTES NFR BLD AUTO: 10.7 % (ref 0–10)
NEUTROPHILS # BLD AUTO: 7.1 THOU/UL (ref 1.4–6.5)
NEUTROPHILS NFR BLD AUTO: 78.3 % (ref 42–75)
PLATELET # BLD AUTO: 216 10X3/UL (ref 130–400)
POTASSIUM SERPL-SCNC: 4.1 MMOL/L (ref 3.5–5.1)
RBC # BLD AUTO: 4.72 MILL/UL (ref 4.7–6.1)
SODIUM SERPL-SCNC: 140 MMOL/L (ref 136–145)
WBC # BLD AUTO: 9.1 10X3/UL (ref 4.8–10.8)

## 2023-02-07 PROCEDURE — 06HY33Z INSERTION OF INFUSION DEVICE INTO LOWER VEIN, PERCUTANEOUS APPROACH: ICD-10-PCS | Performed by: SURGERY

## 2023-02-07 PROCEDURE — 4A10X4Z MONITORING OF CENTRAL NERVOUS ELECTRICAL ACTIVITY, EXTERNAL APPROACH: ICD-10-PCS | Performed by: PSYCHIATRY & NEUROLOGY

## 2023-02-07 RX ADMIN — SODIUM BICARBONATE SCH MLS: 84 INJECTION, SOLUTION INTRAVENOUS at 01:14

## 2023-02-07 RX ADMIN — HEPARIN SODIUM SCH UNITS: 5000 INJECTION, SOLUTION INTRAVENOUS; SUBCUTANEOUS at 15:49

## 2023-02-07 RX ADMIN — SODIUM BICARBONATE SCH: 325 TABLET ORAL at 21:20

## 2023-02-07 RX ADMIN — ALBUMIN HUMAN SCH GM: 250 SOLUTION INTRAVENOUS at 12:34

## 2023-02-07 RX ADMIN — ASPIRIN 81 MG CHEWABLE TABLET SCH: 81 TABLET CHEWABLE at 08:17

## 2023-02-07 RX ADMIN — HEPARIN SODIUM SCH UNITS: 5000 INJECTION, SOLUTION INTRAVENOUS; SUBCUTANEOUS at 08:24

## 2023-02-07 RX ADMIN — HEPARIN SODIUM SCH UNITS: 5000 INJECTION, SOLUTION INTRAVENOUS; SUBCUTANEOUS at 21:22

## 2023-02-07 RX ADMIN — ALBUMIN HUMAN SCH GM: 250 SOLUTION INTRAVENOUS at 16:47

## 2023-02-07 RX ADMIN — SODIUM BICARBONATE SCH: 325 TABLET ORAL at 08:17

## 2023-02-07 RX ADMIN — SODIUM BICARBONATE SCH: 325 TABLET ORAL at 15:49

## 2023-02-07 RX ADMIN — ALBUMIN HUMAN SCH GM: 250 SOLUTION INTRAVENOUS at 23:28

## 2023-02-07 RX ADMIN — Medication SCH ML: at 08:17

## 2023-02-07 RX ADMIN — Medication SCH ML: at 21:27

## 2023-02-08 LAB
ALBUMIN SERPL BCG-MCNC: 2.5 G/DL (ref 3.4–4.8)
ALP SERPL-CCNC: 58 U/L (ref 40–110)
ALT SERPL W P-5'-P-CCNC: (no result) U/L (ref 8–55)
ANION GAP SERPL CALC-SCNC: 15 MMOL/L (ref 10–20)
AST SERPL-CCNC: 14 U/L (ref 5–34)
BASOPHILS # BLD AUTO: 0 THOU/UL (ref 0–0.2)
BASOPHILS NFR BLD AUTO: 0.3 % (ref 0–1)
BILIRUB SERPL-MCNC: 0.4 MG/DL (ref 0.2–1.2)
BUN SERPL-MCNC: 49 MG/DL (ref 8.4–25.7)
CALCIUM SERPL-MCNC: 8 MG/DL (ref 7.8–10.44)
CHD RISK SERPL-RTO: 6.1 (ref ?–4.5)
CHLORIDE SERPL-SCNC: 110 MMOL/L (ref 98–107)
CHOLEST SERPL-MCNC: 116 MG/DL
CO2 SERPL-SCNC: 21 MMOL/L (ref 23–31)
CREAT CL PREDICTED SERPL C-G-VRATE: 15 ML/MIN (ref 70–130)
EOSINOPHIL # BLD AUTO: 0.3 THOU/UL (ref 0–0.7)
EOSINOPHIL NFR BLD AUTO: 4.4 % (ref 0–10)
GLOBULIN SER CALC-MCNC: 2.2 G/DL (ref 2.4–3.5)
GLUCOSE SERPL-MCNC: 110 MG/DL (ref 80–115)
HDLC SERPL-MCNC: 19 MG/DL
HGB BLD-MCNC: 8.8 G/DL (ref 14–18)
LDLC SERPL CALC-MCNC: 67 MG/DL
LYMPHOCYTES # BLD: 0.9 THOU/UL (ref 1.2–3.4)
LYMPHOCYTES NFR BLD AUTO: 13.9 % (ref 21–51)
MCH RBC QN AUTO: 25 PG (ref 27–31)
MCV RBC AUTO: 80.6 FL (ref 78–98)
MONOCYTES # BLD AUTO: 0.8 THOU/UL (ref 0.11–0.59)
MONOCYTES NFR BLD AUTO: 11.9 % (ref 0–10)
NEUTROPHILS # BLD AUTO: 4.5 THOU/UL (ref 1.4–6.5)
NEUTROPHILS NFR BLD AUTO: 69.5 % (ref 42–75)
PLATELET # BLD AUTO: 156 10X3/UL (ref 130–400)
POTASSIUM SERPL-SCNC: 3.7 MMOL/L (ref 3.5–5.1)
RBC # BLD AUTO: 3.52 MILL/UL (ref 4.7–6.1)
SODIUM SERPL-SCNC: 142 MMOL/L (ref 136–145)
TRIGL SERPL-MCNC: 150 MG/DL (ref ?–150)
WBC # BLD AUTO: 6.5 10X3/UL (ref 4.8–10.8)

## 2023-02-08 RX ADMIN — Medication SCH ML: at 07:44

## 2023-02-08 RX ADMIN — SODIUM BICARBONATE SCH: 325 TABLET ORAL at 20:21

## 2023-02-08 RX ADMIN — HEPARIN SODIUM SCH UNITS: 5000 INJECTION, SOLUTION INTRAVENOUS; SUBCUTANEOUS at 20:23

## 2023-02-08 RX ADMIN — HEPARIN SODIUM SCH UNITS: 5000 INJECTION, SOLUTION INTRAVENOUS; SUBCUTANEOUS at 07:44

## 2023-02-08 RX ADMIN — SCOPALAMINE SCH MG: 1 PATCH, EXTENDED RELEASE TRANSDERMAL at 08:58

## 2023-02-08 RX ADMIN — SODIUM BICARBONATE SCH: 325 TABLET ORAL at 15:05

## 2023-02-08 RX ADMIN — SODIUM BICARBONATE SCH: 325 TABLET ORAL at 07:44

## 2023-02-08 RX ADMIN — ALBUMIN HUMAN SCH GM: 250 SOLUTION INTRAVENOUS at 06:06

## 2023-02-08 RX ADMIN — Medication SCH ML: at 20:25

## 2023-02-08 RX ADMIN — ASPIRIN 81 MG CHEWABLE TABLET SCH: 81 TABLET CHEWABLE at 07:44

## 2023-02-08 RX ADMIN — HEPARIN SODIUM SCH UNITS: 5000 INJECTION, SOLUTION INTRAVENOUS; SUBCUTANEOUS at 15:05

## 2023-02-09 LAB
ALBUMIN SERPL BCG-MCNC: 2.4 G/DL (ref 3.4–4.8)
ALP SERPL-CCNC: 82 U/L (ref 40–110)
ALT SERPL W P-5'-P-CCNC: 7 U/L (ref 8–55)
ANION GAP SERPL CALC-SCNC: 14 MMOL/L (ref 10–20)
AST SERPL-CCNC: 16 U/L (ref 5–34)
BILIRUB SERPL-MCNC: 0.6 MG/DL (ref 0.2–1.2)
BUN SERPL-MCNC: 37 MG/DL (ref 8.4–25.7)
CALCIUM SERPL-MCNC: 8.5 MG/DL (ref 7.8–10.44)
CHLORIDE SERPL-SCNC: 108 MMOL/L (ref 98–107)
CO2 SERPL-SCNC: 22 MMOL/L (ref 23–31)
CREAT CL PREDICTED SERPL C-G-VRATE: 17 ML/MIN (ref 70–130)
GLOBULIN SER CALC-MCNC: 2.8 G/DL (ref 2.4–3.5)
GLUCOSE SERPL-MCNC: 89 MG/DL (ref 80–115)
MAGNESIUM SERPL-MCNC: 1.6 MG/DL (ref 1.6–2.6)
POTASSIUM SERPL-SCNC: 3.7 MMOL/L (ref 3.5–5.1)
SODIUM SERPL-SCNC: 140 MMOL/L (ref 136–145)

## 2023-02-09 RX ADMIN — SODIUM BICARBONATE SCH: 325 TABLET ORAL at 19:31

## 2023-02-09 RX ADMIN — Medication SCH ML: at 08:14

## 2023-02-09 RX ADMIN — HEPARIN SODIUM SCH UNITS: 5000 INJECTION, SOLUTION INTRAVENOUS; SUBCUTANEOUS at 08:14

## 2023-02-09 RX ADMIN — HEPARIN SODIUM SCH UNITS: 5000 INJECTION, SOLUTION INTRAVENOUS; SUBCUTANEOUS at 14:14

## 2023-02-09 RX ADMIN — Medication SCH ML: at 20:34

## 2023-02-09 RX ADMIN — HEPARIN SODIUM SCH UNITS: 5000 INJECTION, SOLUTION INTRAVENOUS; SUBCUTANEOUS at 20:34

## 2023-02-09 RX ADMIN — ASPIRIN 81 MG CHEWABLE TABLET SCH: 81 TABLET CHEWABLE at 08:14

## 2023-02-09 RX ADMIN — SODIUM BICARBONATE SCH: 325 TABLET ORAL at 08:14

## 2023-02-09 RX ADMIN — SODIUM BICARBONATE SCH: 325 TABLET ORAL at 14:32

## 2023-02-10 LAB
ANALYZER IN CARDIO: (no result)
ANION GAP SERPL CALC-SCNC: 14 MMOL/L (ref 10–20)
BASE EXCESS STD BLDA CALC-SCNC: -1.8 MEQ/L
BASOPHILS # BLD AUTO: 0 THOU/UL (ref 0–0.2)
BASOPHILS NFR BLD AUTO: 0.5 % (ref 0–1)
BUN SERPL-MCNC: 25 MG/DL (ref 8.4–25.7)
CA-I BLDA-SCNC: 1.09 MMOL/L (ref 1.12–1.3)
CALCIUM SERPL-MCNC: 8.3 MG/DL (ref 7.8–10.44)
CHLORIDE SERPL-SCNC: 106 MMOL/L (ref 98–107)
CO2 SERPL-SCNC: 24 MMOL/L (ref 23–31)
CREAT CL PREDICTED SERPL C-G-VRATE: 22 ML/MIN (ref 70–130)
EOSINOPHIL # BLD AUTO: 0.4 THOU/UL (ref 0–0.7)
EOSINOPHIL NFR BLD AUTO: 4.8 % (ref 0–10)
GLUCOSE SERPL-MCNC: 81 MG/DL (ref 80–115)
HCO3 BLDA-SCNC: 22.7 MEQ/L (ref 22–28)
HCT VFR BLDA CALC: 41 % (ref 42–52)
HGB BLD-MCNC: 10.2 G/DL (ref 14–18)
HGB BLDA-MCNC: 14 G/DL (ref 14–18)
LYMPHOCYTES # BLD: 1 THOU/UL (ref 1.2–3.4)
LYMPHOCYTES NFR BLD AUTO: 13 % (ref 21–51)
MCH RBC QN AUTO: 25.6 PG (ref 27–31)
MCV RBC AUTO: 82.8 FL (ref 78–98)
MONOCYTES # BLD AUTO: 0.9 THOU/UL (ref 0.11–0.59)
MONOCYTES NFR BLD AUTO: 12.6 % (ref 0–10)
NEUTROPHILS # BLD AUTO: 5.2 THOU/UL (ref 1.4–6.5)
NEUTROPHILS NFR BLD AUTO: 69.1 % (ref 42–75)
O2 A-A PPRESDIFF RESPIRATORY: 98.8 MMHG (ref 0–20)
PCO2 BLDA: 38 MMHG (ref 35–45)
PH BLDA: 7.39 [PH] (ref 7.35–7.45)
PLATELET # BLD AUTO: 176 10X3/UL (ref 130–400)
PO2 BLDA: 138.9 MMHG (ref 80–?)
POTASSIUM BLD-SCNC: 3.6 MMOL/L (ref 3.7–5.3)
POTASSIUM SERPL-SCNC: 3.6 MMOL/L (ref 3.5–5.1)
RBC # BLD AUTO: 3.99 MILL/UL (ref 4.7–6.1)
SODIUM SERPL-SCNC: 140 MMOL/L (ref 136–145)
SPECIMEN DRAWN FROM PATIENT: (no result)
WBC # BLD AUTO: 7.5 10X3/UL (ref 4.8–10.8)

## 2023-02-10 RX ADMIN — Medication SCH ML: at 20:48

## 2023-02-10 RX ADMIN — ASPIRIN 81 MG CHEWABLE TABLET SCH MG: 81 TABLET CHEWABLE at 10:27

## 2023-02-10 RX ADMIN — HEPARIN SODIUM SCH UNITS: 5000 INJECTION, SOLUTION INTRAVENOUS; SUBCUTANEOUS at 20:48

## 2023-02-10 RX ADMIN — Medication SCH ML: at 10:28

## 2023-02-10 RX ADMIN — HEPARIN SODIUM SCH UNITS: 5000 INJECTION, SOLUTION INTRAVENOUS; SUBCUTANEOUS at 08:11

## 2023-02-10 RX ADMIN — SODIUM BICARBONATE SCH: 325 TABLET ORAL at 10:28

## 2023-02-10 RX ADMIN — HEPARIN SODIUM SCH UNITS: 5000 INJECTION, SOLUTION INTRAVENOUS; SUBCUTANEOUS at 15:31

## 2023-02-11 LAB
ANALYZER IN CARDIO: (no result)
ANION GAP SERPL CALC-SCNC: 13 MMOL/L (ref 10–20)
BASE EXCESS STD BLDA CALC-SCNC: 3.9 MEQ/L
BASOPHILS # BLD AUTO: 0 THOU/UL (ref 0–0.2)
BASOPHILS NFR BLD AUTO: 0.4 % (ref 0–1)
BUN SERPL-MCNC: 15 MG/DL (ref 8.4–25.7)
CA-I BLDA-SCNC: 1.1 MMOL/L (ref 1.12–1.3)
CALCIUM SERPL-MCNC: 8.1 MG/DL (ref 7.8–10.44)
CHLORIDE SERPL-SCNC: 102 MMOL/L (ref 98–107)
CO2 SERPL-SCNC: 26 MMOL/L (ref 23–31)
CREAT CL PREDICTED SERPL C-G-VRATE: 28 ML/MIN (ref 70–130)
EOSINOPHIL # BLD AUTO: 0.4 THOU/UL (ref 0–0.7)
EOSINOPHIL NFR BLD AUTO: 4.7 % (ref 0–10)
GLUCOSE SERPL-MCNC: 123 MG/DL (ref 80–115)
HCO3 BLDA-SCNC: 27.5 MEQ/L (ref 22–28)
HCT VFR BLDA CALC: 31 % (ref 42–52)
HGB BLD-MCNC: 10.1 G/DL (ref 14–18)
HGB BLDA-MCNC: 10.6 G/DL (ref 14–18)
LYMPHOCYTES # BLD: 1 THOU/UL (ref 1.2–3.4)
LYMPHOCYTES NFR BLD AUTO: 12.3 % (ref 21–51)
MCH RBC QN AUTO: 25.4 PG (ref 27–31)
MCV RBC AUTO: 82.3 FL (ref 78–98)
MONOCYTES # BLD AUTO: 1 THOU/UL (ref 0.11–0.59)
MONOCYTES NFR BLD AUTO: 12.9 % (ref 0–10)
NEUTROPHILS # BLD AUTO: 5.4 THOU/UL (ref 1.4–6.5)
NEUTROPHILS NFR BLD AUTO: 69.8 % (ref 42–75)
O2 A-A PPRESDIFF RESPIRATORY: 62.22 MMHG (ref 0–20)
PCO2 BLDA: 37.7 MMHG (ref 35–45)
PH BLDA: 7.48 [PH] (ref 7.35–7.45)
PLATELET # BLD AUTO: 165 10X3/UL (ref 130–400)
PO2 BLDA: 90.3 MMHG (ref 80–?)
POTASSIUM BLD-SCNC: 3.42 MMOL/L (ref 3.7–5.3)
POTASSIUM SERPL-SCNC: 3.6 MMOL/L (ref 3.5–5.1)
RBC # BLD AUTO: 3.96 MILL/UL (ref 4.7–6.1)
SODIUM SERPL-SCNC: 137 MMOL/L (ref 136–145)
SPECIMEN DRAWN FROM PATIENT: (no result)
WBC # BLD AUTO: 7.7 10X3/UL (ref 4.8–10.8)

## 2023-02-11 RX ADMIN — HEPARIN SODIUM SCH UNITS: 5000 INJECTION, SOLUTION INTRAVENOUS; SUBCUTANEOUS at 15:56

## 2023-02-11 RX ADMIN — HEPARIN SODIUM SCH UNITS: 5000 INJECTION, SOLUTION INTRAVENOUS; SUBCUTANEOUS at 21:32

## 2023-02-11 RX ADMIN — HEPARIN SODIUM SCH UNITS: 5000 INJECTION, SOLUTION INTRAVENOUS; SUBCUTANEOUS at 08:16

## 2023-02-11 RX ADMIN — EPOETIN ALFA-EPBX SCH UNIT: 10000 INJECTION, SOLUTION INTRAVENOUS; SUBCUTANEOUS at 13:39

## 2023-02-11 RX ADMIN — Medication SCH ML: at 08:32

## 2023-02-11 RX ADMIN — ASPIRIN 81 MG CHEWABLE TABLET SCH MG: 81 TABLET CHEWABLE at 08:17

## 2023-02-11 RX ADMIN — Medication SCH ML: at 21:33

## 2023-02-11 RX ADMIN — SCOPALAMINE SCH MG: 1 PATCH, EXTENDED RELEASE TRANSDERMAL at 08:17

## 2023-02-12 LAB
ANALYZER IN CARDIO: (no result)
ANION GAP SERPL CALC-SCNC: 15 MMOL/L (ref 10–20)
BASE EXCESS STD BLDA CALC-SCNC: 2.3 MEQ/L
BASOPHILS # BLD AUTO: 0 THOU/UL (ref 0–0.2)
BASOPHILS NFR BLD AUTO: 0.4 % (ref 0–1)
BUN SERPL-MCNC: 22 MG/DL (ref 8.4–25.7)
CA-I BLDA-SCNC: 1.11 MMOL/L (ref 1.12–1.3)
CALCIUM SERPL-MCNC: 8.1 MG/DL (ref 7.8–10.44)
CHLORIDE SERPL-SCNC: 102 MMOL/L (ref 98–107)
CO2 SERPL-SCNC: 25 MMOL/L (ref 23–31)
CREAT CL PREDICTED SERPL C-G-VRATE: 21 ML/MIN (ref 70–130)
EOSINOPHIL # BLD AUTO: 0.3 THOU/UL (ref 0–0.7)
EOSINOPHIL NFR BLD AUTO: 3.9 % (ref 0–10)
GLUCOSE SERPL-MCNC: 101 MG/DL (ref 80–115)
HCO3 BLDA-SCNC: 26.1 MEQ/L (ref 22–28)
HCT VFR BLDA CALC: 32 % (ref 42–52)
HGB BLD-MCNC: 9.4 G/DL (ref 14–18)
HGB BLDA-MCNC: 10.8 G/DL (ref 14–18)
LYMPHOCYTES # BLD: 0.9 THOU/UL (ref 1.2–3.4)
LYMPHOCYTES NFR BLD AUTO: 11.8 % (ref 21–51)
MCH RBC QN AUTO: 25.1 PG (ref 27–31)
MCV RBC AUTO: 82.6 FL (ref 78–98)
MONOCYTES # BLD AUTO: 1 THOU/UL (ref 0.11–0.59)
MONOCYTES NFR BLD AUTO: 13 % (ref 0–10)
NEUTROPHILS # BLD AUTO: 5.6 THOU/UL (ref 1.4–6.5)
NEUTROPHILS NFR BLD AUTO: 70.8 % (ref 42–75)
O2 A-A PPRESDIFF RESPIRATORY: 60.06 MMHG (ref 0–20)
PCO2 BLDA: 37.5 MMHG (ref 35–45)
PH BLDA: 7.46 [PH] (ref 7.35–7.45)
PLATELET # BLD AUTO: 151 10X3/UL (ref 130–400)
PO2 BLDA: 92.7 MMHG (ref 80–?)
POTASSIUM BLD-SCNC: 3.45 MMOL/L (ref 3.7–5.3)
POTASSIUM SERPL-SCNC: 3.6 MMOL/L (ref 3.5–5.1)
RBC # BLD AUTO: 3.76 MILL/UL (ref 4.7–6.1)
SODIUM SERPL-SCNC: 138 MMOL/L (ref 136–145)
SPECIMEN DRAWN FROM PATIENT: (no result)
WBC # BLD AUTO: 7.9 10X3/UL (ref 4.8–10.8)

## 2023-02-12 PROCEDURE — 0B113F4 BYPASS TRACHEA TO CUTANEOUS WITH TRACHEOSTOMY DEVICE, PERCUTANEOUS APPROACH: ICD-10-PCS | Performed by: SURGERY

## 2023-02-12 PROCEDURE — 0DH63UZ INSERTION OF FEEDING DEVICE INTO STOMACH, PERCUTANEOUS APPROACH: ICD-10-PCS | Performed by: SURGERY

## 2023-02-12 RX ADMIN — HEPARIN SODIUM SCH UNITS: 5000 INJECTION, SOLUTION INTRAVENOUS; SUBCUTANEOUS at 21:04

## 2023-02-12 RX ADMIN — Medication SCH ML: at 09:01

## 2023-02-12 RX ADMIN — Medication SCH ML: at 21:38

## 2023-02-12 RX ADMIN — HEPARIN SODIUM SCH UNITS: 5000 INJECTION, SOLUTION INTRAVENOUS; SUBCUTANEOUS at 15:58

## 2023-02-12 RX ADMIN — ASPIRIN 81 MG CHEWABLE TABLET SCH MG: 81 TABLET CHEWABLE at 09:51

## 2023-02-12 RX ADMIN — HEPARIN SODIUM SCH UNITS: 5000 INJECTION, SOLUTION INTRAVENOUS; SUBCUTANEOUS at 09:51

## 2023-02-13 LAB
ANALYZER IN CARDIO: (no result)
ANION GAP SERPL CALC-SCNC: 19 MMOL/L (ref 10–20)
BASE EXCESS STD BLDA CALC-SCNC: -1.2 MEQ/L
BASOPHILS # BLD AUTO: 0 THOU/UL (ref 0–0.2)
BASOPHILS NFR BLD AUTO: 0.2 % (ref 0–1)
BUN SERPL-MCNC: 29 MG/DL (ref 8.4–25.7)
CA-I BLDA-SCNC: 1.11 MMOL/L (ref 1.12–1.3)
CALCIUM SERPL-MCNC: 8.6 MG/DL (ref 7.8–10.44)
CHLORIDE SERPL-SCNC: 102 MMOL/L (ref 98–107)
CO2 SERPL-SCNC: 21 MMOL/L (ref 23–31)
CREAT CL PREDICTED SERPL C-G-VRATE: 18 ML/MIN (ref 70–130)
EOSINOPHIL # BLD AUTO: 0.2 THOU/UL (ref 0–0.7)
EOSINOPHIL NFR BLD AUTO: 1.8 % (ref 0–10)
GLUCOSE SERPL-MCNC: 88 MG/DL (ref 80–115)
HCO3 BLDA-SCNC: 22.2 MEQ/L (ref 22–28)
HCT VFR BLDA CALC: 34 % (ref 42–52)
HGB BLD-MCNC: 10.2 G/DL (ref 14–18)
HGB BLDA-MCNC: 11.4 G/DL (ref 14–18)
LYMPHOCYTES # BLD: 0.7 THOU/UL (ref 1.2–3.4)
LYMPHOCYTES NFR BLD AUTO: 8.6 % (ref 21–51)
MCH RBC QN AUTO: 25.6 PG (ref 27–31)
MCV RBC AUTO: 83.2 FL (ref 78–98)
MONOCYTES # BLD AUTO: 0.7 THOU/UL (ref 0.11–0.59)
MONOCYTES NFR BLD AUTO: 8.6 % (ref 0–10)
NEUTROPHILS # BLD AUTO: 6.9 THOU/UL (ref 1.4–6.5)
NEUTROPHILS NFR BLD AUTO: 80.8 % (ref 42–75)
PCO2 BLDA: 32.6 MMHG (ref 35–45)
PH BLDA: 7.45 [PH] (ref 7.35–7.45)
PLATELET # BLD AUTO: 184 10X3/UL (ref 130–400)
PO2 BLDA: 68.2 MMHG (ref 80–?)
POTASSIUM BLD-SCNC: 3.68 MMOL/L (ref 3.7–5.3)
POTASSIUM SERPL-SCNC: 3.7 MMOL/L (ref 3.5–5.1)
RBC # BLD AUTO: 3.98 MILL/UL (ref 4.7–6.1)
SODIUM SERPL-SCNC: 138 MMOL/L (ref 136–145)
SPECIMEN DRAWN FROM PATIENT: (no result)
WBC # BLD AUTO: 8.6 10X3/UL (ref 4.8–10.8)

## 2023-02-13 PROCEDURE — 30233J1 TRANSFUSION OF NONAUTOLOGOUS SERUM ALBUMIN INTO PERIPHERAL VEIN, PERCUTANEOUS APPROACH: ICD-10-PCS | Performed by: INTERNAL MEDICINE

## 2023-02-13 RX ADMIN — ASPIRIN 81 MG CHEWABLE TABLET SCH MG: 81 TABLET CHEWABLE at 09:17

## 2023-02-13 RX ADMIN — Medication SCH ML: at 23:15

## 2023-02-13 RX ADMIN — HEPARIN SODIUM SCH UNITS: 5000 INJECTION, SOLUTION INTRAVENOUS; SUBCUTANEOUS at 09:17

## 2023-02-13 RX ADMIN — HEPARIN SODIUM SCH UNITS: 5000 INJECTION, SOLUTION INTRAVENOUS; SUBCUTANEOUS at 21:14

## 2023-02-13 RX ADMIN — HEPARIN SODIUM SCH UNITS: 5000 INJECTION, SOLUTION INTRAVENOUS; SUBCUTANEOUS at 14:21

## 2023-02-13 RX ADMIN — LANSOPRAZOLE SCH MG: KIT at 13:07

## 2023-02-13 RX ADMIN — Medication SCH ML: at 09:18

## 2023-02-14 LAB
ANALYZER IN CARDIO: (no result)
ANION GAP SERPL CALC-SCNC: 15 MMOL/L (ref 10–20)
ANISOCYTOSIS BLD QL SMEAR: (no result) (100X)
BASE EXCESS STD BLDA CALC-SCNC: 3.8 MEQ/L
BUN SERPL-MCNC: 20 MG/DL (ref 8.4–25.7)
CA-I BLDA-SCNC: 1.13 MMOL/L (ref 1.12–1.3)
CALCIUM SERPL-MCNC: 8.5 MG/DL (ref 7.8–10.44)
CHLORIDE SERPL-SCNC: 100 MMOL/L (ref 98–107)
CO2 SERPL-SCNC: 26 MMOL/L (ref 23–31)
CREAT CL PREDICTED SERPL C-G-VRATE: 23 ML/MIN (ref 70–130)
GLUCOSE SERPL-MCNC: 111 MG/DL (ref 80–115)
HCO3 BLDA-SCNC: 27.9 MEQ/L (ref 22–28)
HCT VFR BLDA CALC: 31 % (ref 42–52)
HGB BLD-MCNC: 9.6 G/DL (ref 14–18)
HGB BLDA-MCNC: 10.6 G/DL (ref 14–18)
MCH RBC QN AUTO: 25.1 PG (ref 27–31)
MCV RBC AUTO: 82.4 FL (ref 78–98)
MDIFF COMPLETE?: YES
O2 A-A PPRESDIFF RESPIRATORY: 84.61 MMHG (ref 0–20)
PCO2 BLDA: 39.7 MMHG (ref 35–45)
PH BLDA: 7.46 [PH] (ref 7.35–7.45)
PLATELET # BLD AUTO: 174 10X3/UL (ref 130–400)
PO2 BLDA: 65.4 MMHG (ref 80–?)
POTASSIUM BLD-SCNC: 3.37 MMOL/L (ref 3.7–5.3)
POTASSIUM SERPL-SCNC: 3.5 MMOL/L (ref 3.5–5.1)
RBC # BLD AUTO: 3.82 MILL/UL (ref 4.7–6.1)
SODIUM SERPL-SCNC: 137 MMOL/L (ref 136–145)
SPECIMEN DRAWN FROM PATIENT: (no result)
WBC # BLD AUTO: 9.8 10X3/UL (ref 4.8–10.8)

## 2023-02-14 PROCEDURE — 0JH63XZ INSERTION OF TUNNELED VASCULAR ACCESS DEVICE INTO CHEST SUBCUTANEOUS TISSUE AND FASCIA, PERCUTANEOUS APPROACH: ICD-10-PCS | Performed by: SURGERY

## 2023-02-14 PROCEDURE — B5181ZA FLUOROSCOPY OF SUPERIOR VENA CAVA USING LOW OSMOLAR CONTRAST, GUIDANCE: ICD-10-PCS | Performed by: SURGERY

## 2023-02-14 PROCEDURE — 02HV33Z INSERTION OF INFUSION DEVICE INTO SUPERIOR VENA CAVA, PERCUTANEOUS APPROACH: ICD-10-PCS | Performed by: SURGERY

## 2023-02-14 PROCEDURE — B548ZZA ULTRASONOGRAPHY OF SUPERIOR VENA CAVA, GUIDANCE: ICD-10-PCS | Performed by: SURGERY

## 2023-02-14 RX ADMIN — HEPARIN SODIUM SCH: 5000 INJECTION, SOLUTION INTRAVENOUS; SUBCUTANEOUS at 17:02

## 2023-02-14 RX ADMIN — SCOPALAMINE SCH: 1 PATCH, EXTENDED RELEASE TRANSDERMAL at 17:01

## 2023-02-14 RX ADMIN — Medication SCH: at 18:05

## 2023-02-14 RX ADMIN — LANSOPRAZOLE SCH: KIT at 17:02

## 2023-02-14 RX ADMIN — Medication SCH ML: at 21:50

## 2023-02-14 RX ADMIN — ASPIRIN 81 MG CHEWABLE TABLET SCH: 81 TABLET CHEWABLE at 17:01

## 2023-02-14 RX ADMIN — HEPARIN SODIUM SCH UNITS: 5000 INJECTION, SOLUTION INTRAVENOUS; SUBCUTANEOUS at 21:20

## 2023-02-14 RX ADMIN — HEPARIN SODIUM SCH: 5000 INJECTION, SOLUTION INTRAVENOUS; SUBCUTANEOUS at 17:01

## 2023-02-15 LAB
ANION GAP SERPL CALC-SCNC: 15 MMOL/L (ref 10–20)
BUN SERPL-MCNC: 33 MG/DL (ref 8.4–25.7)
CALCIUM SERPL-MCNC: 8.7 MG/DL (ref 7.8–10.44)
CHLORIDE SERPL-SCNC: 101 MMOL/L (ref 98–107)
CO2 SERPL-SCNC: 26 MMOL/L (ref 23–31)
CREAT CL PREDICTED SERPL C-G-VRATE: 18 ML/MIN (ref 70–130)
GLUCOSE SERPL-MCNC: 111 MG/DL (ref 80–115)
HGB BLD-MCNC: 8.5 G/DL (ref 14–18)
MCH RBC QN AUTO: 25.7 PG (ref 27–31)
MCV RBC AUTO: 83.8 FL (ref 78–98)
MDIFF COMPLETE?: YES
PLATELET # BLD AUTO: 195 10X3/UL (ref 130–400)
POTASSIUM SERPL-SCNC: 3.7 MMOL/L (ref 3.5–5.1)
RBC # BLD AUTO: 3.29 MILL/UL (ref 4.7–6.1)
SODIUM SERPL-SCNC: 138 MMOL/L (ref 136–145)
VANCOMYCIN SERPL-MCNC: 18 UG/ML
WBC # BLD AUTO: 7.7 10X3/UL (ref 4.8–10.8)

## 2023-02-15 RX ADMIN — Medication SCH ML: at 08:19

## 2023-02-15 RX ADMIN — HEPARIN SODIUM SCH UNITS: 5000 INJECTION, SOLUTION INTRAVENOUS; SUBCUTANEOUS at 15:50

## 2023-02-15 RX ADMIN — IPRATROPIUM BROMIDE SCH ML: 0.5 SOLUTION RESPIRATORY (INHALATION) at 14:29

## 2023-02-15 RX ADMIN — IPRATROPIUM BROMIDE SCH ML: 0.5 SOLUTION RESPIRATORY (INHALATION) at 23:10

## 2023-02-15 RX ADMIN — Medication SCH ML: at 20:22

## 2023-02-15 RX ADMIN — HEPARIN SODIUM SCH UNITS: 5000 INJECTION, SOLUTION INTRAVENOUS; SUBCUTANEOUS at 08:18

## 2023-02-15 RX ADMIN — ASPIRIN 81 MG CHEWABLE TABLET SCH MG: 81 TABLET CHEWABLE at 08:17

## 2023-02-15 RX ADMIN — LANSOPRAZOLE SCH MG: KIT at 08:18

## 2023-02-15 RX ADMIN — IPRATROPIUM BROMIDE SCH ML: 0.5 SOLUTION RESPIRATORY (INHALATION) at 19:08

## 2023-02-15 RX ADMIN — HEPARIN SODIUM SCH UNITS: 5000 INJECTION, SOLUTION INTRAVENOUS; SUBCUTANEOUS at 20:20

## 2023-02-16 LAB
ANION GAP SERPL CALC-SCNC: 14 MMOL/L (ref 10–20)
ANISOCYTOSIS BLD QL SMEAR: (no result) (100X)
BUN SERPL-MCNC: 21 MG/DL (ref 8.4–25.7)
CALCIUM SERPL-MCNC: 8.5 MG/DL (ref 7.8–10.44)
CHLORIDE SERPL-SCNC: 101 MMOL/L (ref 98–107)
CO2 SERPL-SCNC: 26 MMOL/L (ref 23–31)
CREAT CL PREDICTED SERPL C-G-VRATE: 27 ML/MIN (ref 70–130)
GLUCOSE SERPL-MCNC: 117 MG/DL (ref 80–115)
HGB BLD-MCNC: 8.1 G/DL (ref 14–18)
MAGNESIUM SERPL-MCNC: 1.6 MG/DL (ref 1.6–2.6)
MCH RBC QN AUTO: 25.6 PG (ref 27–31)
MCV RBC AUTO: 82.8 FL (ref 78–98)
MDIFF COMPLETE?: YES
PLATELET # BLD AUTO: 183 10X3/UL (ref 130–400)
POLYCHROMASIA BLD QL SMEAR: (no result) (100X)
POTASSIUM SERPL-SCNC: 3.5 MMOL/L (ref 3.5–5.1)
RBC # BLD AUTO: 3.15 MILL/UL (ref 4.7–6.1)
SODIUM SERPL-SCNC: 137 MMOL/L (ref 136–145)
VANCOMYCIN SERPL-MCNC: 13.6 UG/ML
WBC # BLD AUTO: 6.8 10X3/UL (ref 4.8–10.8)

## 2023-02-16 RX ADMIN — IPRATROPIUM BROMIDE SCH ML: 0.5 SOLUTION RESPIRATORY (INHALATION) at 18:49

## 2023-02-16 RX ADMIN — IPRATROPIUM BROMIDE SCH ML: 0.5 SOLUTION RESPIRATORY (INHALATION) at 07:42

## 2023-02-16 RX ADMIN — Medication SCH ML: at 10:56

## 2023-02-16 RX ADMIN — LANSOPRAZOLE SCH MG: KIT at 13:05

## 2023-02-16 RX ADMIN — ASPIRIN 81 MG CHEWABLE TABLET SCH MG: 81 TABLET CHEWABLE at 09:59

## 2023-02-16 RX ADMIN — HEPARIN SODIUM SCH UNITS: 5000 INJECTION, SOLUTION INTRAVENOUS; SUBCUTANEOUS at 09:59

## 2023-02-16 RX ADMIN — HEPARIN SODIUM SCH UNITS: 5000 INJECTION, SOLUTION INTRAVENOUS; SUBCUTANEOUS at 20:30

## 2023-02-16 RX ADMIN — IPRATROPIUM BROMIDE SCH ML: 0.5 SOLUTION RESPIRATORY (INHALATION) at 14:04

## 2023-02-16 RX ADMIN — HEPARIN SODIUM SCH UNITS: 5000 INJECTION, SOLUTION INTRAVENOUS; SUBCUTANEOUS at 15:54

## 2023-02-16 RX ADMIN — Medication SCH ML: at 20:30

## 2023-02-16 RX ADMIN — IPRATROPIUM BROMIDE SCH ML: 0.5 SOLUTION RESPIRATORY (INHALATION) at 23:27

## 2023-02-17 LAB
ANION GAP SERPL CALC-SCNC: 14 MMOL/L (ref 10–20)
BASOPHILS # BLD AUTO: 0 THOU/UL (ref 0–0.2)
BASOPHILS NFR BLD AUTO: 0.4 % (ref 0–1)
BUN SERPL-MCNC: 32 MG/DL (ref 8.4–25.7)
CALCIUM SERPL-MCNC: 8.2 MG/DL (ref 7.8–10.44)
CHLORIDE SERPL-SCNC: 97 MMOL/L (ref 98–107)
CO2 SERPL-SCNC: 27 MMOL/L (ref 23–31)
CREAT CL PREDICTED SERPL C-G-VRATE: 21 ML/MIN (ref 70–130)
EOSINOPHIL # BLD AUTO: 0.2 THOU/UL (ref 0–0.7)
EOSINOPHIL NFR BLD AUTO: 3.3 % (ref 0–10)
GLUCOSE SERPL-MCNC: 130 MG/DL (ref 80–115)
HGB BLD-MCNC: 7.9 G/DL (ref 14–18)
LYMPHOCYTES # BLD: 0.6 THOU/UL (ref 1.2–3.4)
LYMPHOCYTES NFR BLD AUTO: 9.4 % (ref 21–51)
MAGNESIUM SERPL-MCNC: 2.1 MG/DL (ref 1.6–2.6)
MCH RBC QN AUTO: 25.4 PG (ref 27–31)
MCV RBC AUTO: 84 FL (ref 78–98)
MONOCYTES # BLD AUTO: 0.9 THOU/UL (ref 0.11–0.59)
MONOCYTES NFR BLD AUTO: 13.5 % (ref 0–10)
NEUTROPHILS # BLD AUTO: 5 THOU/UL (ref 1.4–6.5)
NEUTROPHILS NFR BLD AUTO: 73.4 % (ref 42–75)
PLATELET # BLD AUTO: 201 10X3/UL (ref 130–400)
POTASSIUM SERPL-SCNC: 3.6 MMOL/L (ref 3.5–5.1)
RBC # BLD AUTO: 3.1 MILL/UL (ref 4.7–6.1)
SODIUM SERPL-SCNC: 134 MMOL/L (ref 136–145)
VANCOMYCIN SERPL-MCNC: 22.9 UG/ML
WBC # BLD AUTO: 6.8 10X3/UL (ref 4.8–10.8)

## 2023-02-17 RX ADMIN — LANSOPRAZOLE SCH MG: KIT at 11:12

## 2023-02-17 RX ADMIN — ASPIRIN 81 MG CHEWABLE TABLET SCH MG: 81 TABLET CHEWABLE at 09:30

## 2023-02-17 RX ADMIN — Medication SCH ML: at 09:36

## 2023-02-17 RX ADMIN — IPRATROPIUM BROMIDE SCH ML: 0.5 SOLUTION RESPIRATORY (INHALATION) at 07:44

## 2023-02-17 RX ADMIN — HEPARIN SODIUM SCH UNITS: 5000 INJECTION, SOLUTION INTRAVENOUS; SUBCUTANEOUS at 15:42

## 2023-02-17 RX ADMIN — IPRATROPIUM BROMIDE SCH ML: 0.5 SOLUTION RESPIRATORY (INHALATION) at 23:20

## 2023-02-17 RX ADMIN — HEPARIN SODIUM SCH UNITS: 5000 INJECTION, SOLUTION INTRAVENOUS; SUBCUTANEOUS at 09:30

## 2023-02-17 RX ADMIN — IPRATROPIUM BROMIDE SCH ML: 0.5 SOLUTION RESPIRATORY (INHALATION) at 19:13

## 2023-02-17 RX ADMIN — IPRATROPIUM BROMIDE SCH ML: 0.5 SOLUTION RESPIRATORY (INHALATION) at 14:21

## 2023-02-17 RX ADMIN — HEPARIN SODIUM SCH UNITS: 5000 INJECTION, SOLUTION INTRAVENOUS; SUBCUTANEOUS at 21:06

## 2023-02-17 RX ADMIN — Medication SCH ML: at 21:06

## 2023-02-18 LAB
ANION GAP SERPL CALC-SCNC: 10 MMOL/L (ref 10–20)
ANION GAP SERPL CALC-SCNC: 13 MMOL/L (ref 10–20)
BUN SERPL-MCNC: 20 MG/DL (ref 8.4–25.7)
BUN SERPL-MCNC: 25 MG/DL (ref 8.4–25.7)
CALCIUM SERPL-MCNC: 8.2 MG/DL (ref 7.8–10.44)
CALCIUM SERPL-MCNC: 8.2 MG/DL (ref 7.8–10.44)
CHLORIDE SERPL-SCNC: 96 MMOL/L (ref 98–107)
CHLORIDE SERPL-SCNC: 97 MMOL/L (ref 98–107)
CO2 SERPL-SCNC: 28 MMOL/L (ref 23–31)
CO2 SERPL-SCNC: 30 MMOL/L (ref 23–31)
CREAT CL PREDICTED SERPL C-G-VRATE: 25 ML/MIN (ref 70–130)
CREAT CL PREDICTED SERPL C-G-VRATE: 29 ML/MIN (ref 70–130)
GLUCOSE SERPL-MCNC: 129 MG/DL (ref 80–115)
GLUCOSE SERPL-MCNC: 154 MG/DL (ref 80–115)
HGB BLD-MCNC: 7.2 G/DL (ref 14–18)
IRON SERPL-MCNC: 10 UG/DL (ref 65–175)
MAGNESIUM SERPL-MCNC: 1.8 MG/DL (ref 1.6–2.6)
MAGNESIUM SERPL-MCNC: 1.9 MG/DL (ref 1.6–2.6)
MCH RBC QN AUTO: 24.8 PG (ref 27–31)
MCV RBC AUTO: 83.8 FL (ref 78–98)
MDIFF COMPLETE?: YES
PLATELET # BLD AUTO: 209 10X3/UL (ref 130–400)
POTASSIUM SERPL-SCNC: 3.4 MMOL/L (ref 3.5–5.1)
POTASSIUM SERPL-SCNC: 3.5 MMOL/L (ref 3.5–5.1)
RBC # BLD AUTO: 2.88 MILL/UL (ref 4.7–6.1)
SODIUM SERPL-SCNC: 133 MMOL/L (ref 136–145)
SODIUM SERPL-SCNC: 134 MMOL/L (ref 136–145)
UIBC SERPL-MCNC: 108 MCG/DL (ref 261–462)
WBC # BLD AUTO: 5.6 10X3/UL (ref 4.8–10.8)

## 2023-02-18 RX ADMIN — HEPARIN SODIUM SCH UNITS: 5000 INJECTION, SOLUTION INTRAVENOUS; SUBCUTANEOUS at 08:59

## 2023-02-18 RX ADMIN — CEFEPIME HYDROCHLORIDE SCH MLS: 1 INJECTION, POWDER, FOR SOLUTION INTRAMUSCULAR; INTRAVENOUS at 02:56

## 2023-02-18 RX ADMIN — ASPIRIN 81 MG CHEWABLE TABLET SCH MG: 81 TABLET CHEWABLE at 09:00

## 2023-02-18 RX ADMIN — IPRATROPIUM BROMIDE SCH ML: 0.5 SOLUTION RESPIRATORY (INHALATION) at 14:38

## 2023-02-18 RX ADMIN — IPRATROPIUM BROMIDE SCH ML: 0.5 SOLUTION RESPIRATORY (INHALATION) at 19:13

## 2023-02-18 RX ADMIN — IPRATROPIUM BROMIDE SCH ML: 0.5 SOLUTION RESPIRATORY (INHALATION) at 07:28

## 2023-02-18 RX ADMIN — Medication SCH ML: at 21:56

## 2023-02-18 RX ADMIN — Medication SCH ML: at 09:00

## 2023-02-18 RX ADMIN — IPRATROPIUM BROMIDE SCH ML: 0.5 SOLUTION RESPIRATORY (INHALATION) at 23:41

## 2023-02-18 RX ADMIN — EPOETIN ALFA-EPBX SCH UNIT: 10000 INJECTION, SOLUTION INTRAVENOUS; SUBCUTANEOUS at 12:29

## 2023-02-18 RX ADMIN — HEPARIN SODIUM SCH UNITS: 5000 INJECTION, SOLUTION INTRAVENOUS; SUBCUTANEOUS at 15:31

## 2023-02-18 RX ADMIN — HEPARIN SODIUM SCH UNITS: 5000 INJECTION, SOLUTION INTRAVENOUS; SUBCUTANEOUS at 21:56

## 2023-02-18 RX ADMIN — LANSOPRAZOLE SCH MG: KIT at 09:14

## 2023-02-19 LAB
ANION GAP SERPL CALC-SCNC: 12 MMOL/L (ref 10–20)
ANISOCYTOSIS BLD QL SMEAR: (no result) (100X)
BUN SERPL-MCNC: 36 MG/DL (ref 8.4–25.7)
CALCIUM SERPL-MCNC: 8.2 MG/DL (ref 7.8–10.44)
CHLORIDE SERPL-SCNC: 96 MMOL/L (ref 98–107)
CO2 SERPL-SCNC: 29 MMOL/L (ref 23–31)
CREAT CL PREDICTED SERPL C-G-VRATE: 21 ML/MIN (ref 70–130)
GLUCOSE SERPL-MCNC: 141 MG/DL (ref 80–115)
HGB BLD-MCNC: 7.6 G/DL (ref 14–18)
MAGNESIUM SERPL-MCNC: 1.8 MG/DL (ref 1.6–2.6)
MCH RBC QN AUTO: 25.1 PG (ref 27–31)
MCV RBC AUTO: 83.4 FL (ref 78–98)
MDIFF COMPLETE?: YES
PLATELET # BLD AUTO: 279 10X3/UL (ref 130–400)
POLYCHROMASIA BLD QL SMEAR: (no result) (100X)
POTASSIUM SERPL-SCNC: 3.3 MMOL/L (ref 3.5–5.1)
RBC # BLD AUTO: 3.04 MILL/UL (ref 4.7–6.1)
SODIUM SERPL-SCNC: 134 MMOL/L (ref 136–145)
TARGETS BLD QL SMEAR: (no result) (100X)
WBC # BLD AUTO: 7.3 10X3/UL (ref 4.8–10.8)

## 2023-02-19 RX ADMIN — IPRATROPIUM BROMIDE SCH ML: 0.5 SOLUTION RESPIRATORY (INHALATION) at 07:56

## 2023-02-19 RX ADMIN — IPRATROPIUM BROMIDE SCH ML: 0.5 SOLUTION RESPIRATORY (INHALATION) at 14:40

## 2023-02-19 RX ADMIN — SCOPALAMINE SCH MG: 1 PATCH, EXTENDED RELEASE TRANSDERMAL at 09:21

## 2023-02-19 RX ADMIN — HEPARIN SODIUM SCH UNITS: 5000 INJECTION, SOLUTION INTRAVENOUS; SUBCUTANEOUS at 09:17

## 2023-02-19 RX ADMIN — CEFEPIME HYDROCHLORIDE SCH MLS: 1 INJECTION, POWDER, FOR SOLUTION INTRAMUSCULAR; INTRAVENOUS at 02:52

## 2023-02-19 RX ADMIN — ASPIRIN 81 MG CHEWABLE TABLET SCH MG: 81 TABLET CHEWABLE at 09:15

## 2023-02-19 RX ADMIN — HEPARIN SODIUM SCH UNITS: 5000 INJECTION, SOLUTION INTRAVENOUS; SUBCUTANEOUS at 14:05

## 2023-02-19 RX ADMIN — Medication SCH ML: at 09:40

## 2023-02-19 RX ADMIN — LANSOPRAZOLE SCH MG: KIT at 09:13

## 2023-02-19 RX ADMIN — GUAIFENESIN AND DEXTROMETHORPHAN SCH ML: 100; 10 SYRUP ORAL at 14:06

## 2023-02-19 RX ADMIN — Medication SCH ML: at 21:26

## 2023-02-19 RX ADMIN — GUAIFENESIN AND DEXTROMETHORPHAN SCH ML: 100; 10 SYRUP ORAL at 21:25

## 2023-02-19 RX ADMIN — HEPARIN SODIUM SCH UNITS: 5000 INJECTION, SOLUTION INTRAVENOUS; SUBCUTANEOUS at 21:25

## 2023-02-19 RX ADMIN — IPRATROPIUM BROMIDE SCH ML: 0.5 SOLUTION RESPIRATORY (INHALATION) at 23:00

## 2023-02-19 RX ADMIN — GUAIFENESIN AND DEXTROMETHORPHAN SCH ML: 100; 10 SYRUP ORAL at 09:17

## 2023-02-19 RX ADMIN — IPRATROPIUM BROMIDE SCH ML: 0.5 SOLUTION RESPIRATORY (INHALATION) at 19:14

## 2023-02-20 LAB
ANION GAP SERPL CALC-SCNC: 10 MMOL/L (ref 10–20)
BUN SERPL-MCNC: 48 MG/DL (ref 8.4–25.7)
CALCIUM SERPL-MCNC: 8.4 MG/DL (ref 7.8–10.44)
CHLORIDE SERPL-SCNC: 99 MMOL/L (ref 98–107)
CO2 SERPL-SCNC: 28 MMOL/L (ref 23–31)
CREAT CL PREDICTED SERPL C-G-VRATE: 18 ML/MIN (ref 70–130)
GLUCOSE SERPL-MCNC: 118 MG/DL (ref 80–115)
MAGNESIUM SERPL-MCNC: 1.8 MG/DL (ref 1.6–2.6)
POTASSIUM SERPL-SCNC: 3.6 MMOL/L (ref 3.5–5.1)
SODIUM SERPL-SCNC: 133 MMOL/L (ref 136–145)
VANCOMYCIN SERPL-MCNC: 16.1 UG/ML

## 2023-02-20 RX ADMIN — HEPARIN SODIUM SCH UNITS: 5000 INJECTION, SOLUTION INTRAVENOUS; SUBCUTANEOUS at 14:38

## 2023-02-20 RX ADMIN — Medication SCH: at 08:24

## 2023-02-20 RX ADMIN — LANSOPRAZOLE SCH MG: KIT at 08:25

## 2023-02-20 RX ADMIN — IPRATROPIUM BROMIDE SCH ML: 0.5 SOLUTION RESPIRATORY (INHALATION) at 07:28

## 2023-02-20 RX ADMIN — HEPARIN SODIUM SCH UNITS: 5000 INJECTION, SOLUTION INTRAVENOUS; SUBCUTANEOUS at 21:06

## 2023-02-20 RX ADMIN — GUAIFENESIN AND DEXTROMETHORPHAN SCH ML: 100; 10 SYRUP ORAL at 08:23

## 2023-02-20 RX ADMIN — ASPIRIN 81 MG CHEWABLE TABLET SCH MG: 81 TABLET CHEWABLE at 07:41

## 2023-02-20 RX ADMIN — GUAIFENESIN AND DEXTROMETHORPHAN SCH ML: 100; 10 SYRUP ORAL at 03:03

## 2023-02-20 RX ADMIN — GUAIFENESIN AND DEXTROMETHORPHAN SCH ML: 100; 10 SYRUP ORAL at 21:06

## 2023-02-20 RX ADMIN — IPRATROPIUM BROMIDE SCH ML: 0.5 SOLUTION RESPIRATORY (INHALATION) at 13:29

## 2023-02-20 RX ADMIN — Medication SCH ML: at 21:08

## 2023-02-20 RX ADMIN — CEFEPIME HYDROCHLORIDE SCH MLS: 1 INJECTION, POWDER, FOR SOLUTION INTRAMUSCULAR; INTRAVENOUS at 03:00

## 2023-02-20 RX ADMIN — GUAIFENESIN AND DEXTROMETHORPHAN SCH ML: 100; 10 SYRUP ORAL at 14:38

## 2023-02-20 RX ADMIN — IPRATROPIUM BROMIDE SCH ML: 0.5 SOLUTION RESPIRATORY (INHALATION) at 18:41

## 2023-02-20 RX ADMIN — HEPARIN SODIUM SCH UNITS: 5000 INJECTION, SOLUTION INTRAVENOUS; SUBCUTANEOUS at 07:40

## 2023-02-21 LAB
ANION GAP SERPL CALC-SCNC: 10 MMOL/L (ref 10–20)
BUN SERPL-MCNC: 25 MG/DL (ref 8.4–25.7)
CALCIUM SERPL-MCNC: 8.3 MG/DL (ref 7.8–10.44)
CHLORIDE SERPL-SCNC: 99 MMOL/L (ref 98–107)
CO2 SERPL-SCNC: 29 MMOL/L (ref 23–31)
CREAT CL PREDICTED SERPL C-G-VRATE: 26 ML/MIN (ref 70–130)
GLUCOSE SERPL-MCNC: 95 MG/DL (ref 80–115)
MAGNESIUM SERPL-MCNC: 1.7 MG/DL (ref 1.6–2.6)
POTASSIUM SERPL-SCNC: 3.7 MMOL/L (ref 3.5–5.1)
SODIUM SERPL-SCNC: 134 MMOL/L (ref 136–145)

## 2023-02-21 RX ADMIN — GUAIFENESIN AND DEXTROMETHORPHAN SCH ML: 100; 10 SYRUP ORAL at 02:57

## 2023-02-21 RX ADMIN — HEPARIN SODIUM SCH UNITS: 5000 INJECTION, SOLUTION INTRAVENOUS; SUBCUTANEOUS at 10:35

## 2023-02-21 RX ADMIN — ASPIRIN 81 MG CHEWABLE TABLET SCH MG: 81 TABLET CHEWABLE at 10:35

## 2023-02-21 RX ADMIN — CEFEPIME HYDROCHLORIDE SCH MLS: 1 INJECTION, POWDER, FOR SOLUTION INTRAMUSCULAR; INTRAVENOUS at 02:56

## 2023-02-21 RX ADMIN — LANSOPRAZOLE SCH MG: KIT at 10:35

## 2023-02-21 RX ADMIN — IPRATROPIUM BROMIDE SCH ML: 0.5 SOLUTION RESPIRATORY (INHALATION) at 22:40

## 2023-02-21 RX ADMIN — Medication SCH ML: at 20:57

## 2023-02-21 RX ADMIN — GUAIFENESIN AND DEXTROMETHORPHAN SCH ML: 100; 10 SYRUP ORAL at 10:34

## 2023-02-21 RX ADMIN — Medication SCH ML: at 10:36

## 2023-02-21 RX ADMIN — IPRATROPIUM BROMIDE SCH ML: 0.5 SOLUTION RESPIRATORY (INHALATION) at 00:18

## 2023-02-21 RX ADMIN — HEPARIN SODIUM SCH UNITS: 5000 INJECTION, SOLUTION INTRAVENOUS; SUBCUTANEOUS at 20:39

## 2023-02-21 RX ADMIN — GUAIFENESIN AND DEXTROMETHORPHAN SCH ML: 100; 10 SYRUP ORAL at 16:28

## 2023-02-21 RX ADMIN — IPRATROPIUM BROMIDE SCH ML: 0.5 SOLUTION RESPIRATORY (INHALATION) at 12:09

## 2023-02-21 RX ADMIN — GUAIFENESIN AND DEXTROMETHORPHAN SCH ML: 100; 10 SYRUP ORAL at 20:39

## 2023-02-21 RX ADMIN — IPRATROPIUM BROMIDE SCH ML: 0.5 SOLUTION RESPIRATORY (INHALATION) at 19:11

## 2023-02-21 RX ADMIN — IPRATROPIUM BROMIDE SCH ML: 0.5 SOLUTION RESPIRATORY (INHALATION) at 07:33

## 2023-02-21 RX ADMIN — HEPARIN SODIUM SCH UNITS: 5000 INJECTION, SOLUTION INTRAVENOUS; SUBCUTANEOUS at 16:29

## 2023-02-22 LAB
ANION GAP SERPL CALC-SCNC: 9 MMOL/L (ref 10–20)
BUN SERPL-MCNC: 35 MG/DL (ref 8.4–25.7)
CALCIUM SERPL-MCNC: 8.3 MG/DL (ref 7.8–10.44)
CHLORIDE SERPL-SCNC: 97 MMOL/L (ref 98–107)
CO2 SERPL-SCNC: 32 MMOL/L (ref 23–31)
CREAT CL PREDICTED SERPL C-G-VRATE: 20 ML/MIN (ref 70–130)
GLUCOSE SERPL-MCNC: 130 MG/DL (ref 80–115)
MAGNESIUM SERPL-MCNC: 2.4 MG/DL (ref 1.6–2.6)
POTASSIUM SERPL-SCNC: 3.6 MMOL/L (ref 3.5–5.1)
SODIUM SERPL-SCNC: 134 MMOL/L (ref 136–145)
VANCOMYCIN SERPL-MCNC: 14.9 UG/ML

## 2023-02-22 RX ADMIN — LANSOPRAZOLE SCH MG: KIT at 12:18

## 2023-02-22 RX ADMIN — GUAIFENESIN AND DEXTROMETHORPHAN SCH ML: 100; 10 SYRUP ORAL at 04:06

## 2023-02-22 RX ADMIN — SCOPALAMINE SCH MG: 1 PATCH, EXTENDED RELEASE TRANSDERMAL at 13:19

## 2023-02-22 RX ADMIN — IPRATROPIUM BROMIDE SCH ML: 0.5 SOLUTION RESPIRATORY (INHALATION) at 13:45

## 2023-02-22 RX ADMIN — IPRATROPIUM BROMIDE SCH ML: 0.5 SOLUTION RESPIRATORY (INHALATION) at 07:42

## 2023-02-22 RX ADMIN — IPRATROPIUM BROMIDE SCH ML: 0.5 SOLUTION RESPIRATORY (INHALATION) at 23:27

## 2023-02-22 RX ADMIN — Medication SCH ML: at 10:50

## 2023-02-22 RX ADMIN — HEPARIN SODIUM SCH UNITS: 5000 INJECTION, SOLUTION INTRAVENOUS; SUBCUTANEOUS at 21:37

## 2023-02-22 RX ADMIN — EPOETIN ALFA-EPBX SCH UNIT: 10000 INJECTION, SOLUTION INTRAVENOUS; SUBCUTANEOUS at 10:49

## 2023-02-22 RX ADMIN — ASPIRIN 81 MG CHEWABLE TABLET SCH MG: 81 TABLET CHEWABLE at 10:45

## 2023-02-22 RX ADMIN — GUAIFENESIN AND DEXTROMETHORPHAN SCH ML: 100; 10 SYRUP ORAL at 21:36

## 2023-02-22 RX ADMIN — GUAIFENESIN AND DEXTROMETHORPHAN SCH ML: 100; 10 SYRUP ORAL at 16:07

## 2023-02-22 RX ADMIN — GUAIFENESIN AND DEXTROMETHORPHAN SCH ML: 100; 10 SYRUP ORAL at 10:47

## 2023-02-22 RX ADMIN — HEPARIN SODIUM SCH UNITS: 5000 INJECTION, SOLUTION INTRAVENOUS; SUBCUTANEOUS at 10:49

## 2023-02-22 RX ADMIN — CEFEPIME HYDROCHLORIDE SCH MLS: 1 INJECTION, POWDER, FOR SOLUTION INTRAMUSCULAR; INTRAVENOUS at 04:06

## 2023-02-22 RX ADMIN — IPRATROPIUM BROMIDE SCH ML: 0.5 SOLUTION RESPIRATORY (INHALATION) at 19:07

## 2023-02-22 RX ADMIN — HEPARIN SODIUM SCH UNITS: 5000 INJECTION, SOLUTION INTRAVENOUS; SUBCUTANEOUS at 16:01

## 2023-02-22 RX ADMIN — Medication SCH ML: at 21:37

## 2023-02-23 LAB
ANION GAP SERPL CALC-SCNC: 9 MMOL/L (ref 10–20)
BUN SERPL-MCNC: 23 MG/DL (ref 8.4–25.7)
CALCIUM SERPL-MCNC: 8.5 MG/DL (ref 7.8–10.44)
CHLORIDE SERPL-SCNC: 99 MMOL/L (ref 98–107)
CO2 SERPL-SCNC: 30 MMOL/L (ref 23–31)
CREAT CL PREDICTED SERPL C-G-VRATE: 27 ML/MIN (ref 70–130)
GLUCOSE SERPL-MCNC: 132 MG/DL (ref 80–115)
MAGNESIUM SERPL-MCNC: 2 MG/DL (ref 1.6–2.6)
POTASSIUM SERPL-SCNC: 3.5 MMOL/L (ref 3.5–5.1)
SODIUM SERPL-SCNC: 134 MMOL/L (ref 136–145)

## 2023-02-23 RX ADMIN — Medication SCH ML: at 20:45

## 2023-02-23 RX ADMIN — IPRATROPIUM BROMIDE SCH ML: 0.5 SOLUTION RESPIRATORY (INHALATION) at 06:55

## 2023-02-23 RX ADMIN — GUAIFENESIN AND DEXTROMETHORPHAN SCH ML: 100; 10 SYRUP ORAL at 04:22

## 2023-02-23 RX ADMIN — IPRATROPIUM BROMIDE SCH ML: 0.5 SOLUTION RESPIRATORY (INHALATION) at 18:35

## 2023-02-23 RX ADMIN — GUAIFENESIN AND DEXTROMETHORPHAN SCH ML: 100; 10 SYRUP ORAL at 08:04

## 2023-02-23 RX ADMIN — HEPARIN SODIUM SCH UNITS: 5000 INJECTION, SOLUTION INTRAVENOUS; SUBCUTANEOUS at 16:31

## 2023-02-23 RX ADMIN — GUAIFENESIN AND DEXTROMETHORPHAN SCH ML: 100; 10 SYRUP ORAL at 16:31

## 2023-02-23 RX ADMIN — IPRATROPIUM BROMIDE SCH ML: 0.5 SOLUTION RESPIRATORY (INHALATION) at 22:20

## 2023-02-23 RX ADMIN — IPRATROPIUM BROMIDE SCH ML: 0.5 SOLUTION RESPIRATORY (INHALATION) at 13:43

## 2023-02-23 RX ADMIN — HEPARIN SODIUM SCH UNITS: 5000 INJECTION, SOLUTION INTRAVENOUS; SUBCUTANEOUS at 08:04

## 2023-02-23 RX ADMIN — HEPARIN SODIUM SCH UNITS: 5000 INJECTION, SOLUTION INTRAVENOUS; SUBCUTANEOUS at 20:45

## 2023-02-23 RX ADMIN — ASPIRIN 81 MG CHEWABLE TABLET SCH MG: 81 TABLET CHEWABLE at 08:04

## 2023-02-23 RX ADMIN — CEFEPIME HYDROCHLORIDE SCH MLS: 1 INJECTION, POWDER, FOR SOLUTION INTRAMUSCULAR; INTRAVENOUS at 04:22

## 2023-02-23 RX ADMIN — GUAIFENESIN AND DEXTROMETHORPHAN SCH ML: 100; 10 SYRUP ORAL at 20:45

## 2023-02-23 RX ADMIN — Medication SCH ML: at 08:04

## 2023-02-23 RX ADMIN — LANSOPRAZOLE SCH MG: KIT at 08:04

## 2023-02-24 LAB
ANION GAP SERPL CALC-SCNC: 14 MMOL/L (ref 10–20)
BASOPHILS # BLD AUTO: 0 THOU/UL (ref 0–0.2)
BASOPHILS NFR BLD AUTO: 0.2 % (ref 0–1)
BUN SERPL-MCNC: 34 MG/DL (ref 8.4–25.7)
CALCIUM SERPL-MCNC: 8.5 MG/DL (ref 7.8–10.44)
CHLORIDE SERPL-SCNC: 98 MMOL/L (ref 98–107)
CO2 SERPL-SCNC: 27 MMOL/L (ref 23–31)
CREAT CL PREDICTED SERPL C-G-VRATE: 21 ML/MIN (ref 70–130)
EOSINOPHIL # BLD AUTO: 0.2 THOU/UL (ref 0–0.7)
EOSINOPHIL NFR BLD AUTO: 2 % (ref 0–10)
GLUCOSE SERPL-MCNC: 138 MG/DL (ref 80–115)
HGB BLD-MCNC: 7.3 G/DL (ref 14–18)
LYMPHOCYTES # BLD: 1.2 THOU/UL (ref 1.2–3.4)
LYMPHOCYTES NFR BLD AUTO: 11 % (ref 21–51)
MCH RBC QN AUTO: 24.7 PG (ref 27–31)
MCV RBC AUTO: 82.6 FL (ref 78–98)
MONOCYTES # BLD AUTO: 1.1 THOU/UL (ref 0.11–0.59)
MONOCYTES NFR BLD AUTO: 9.8 % (ref 0–10)
NEUTROPHILS # BLD AUTO: 8.3 THOU/UL (ref 1.4–6.5)
NEUTROPHILS NFR BLD AUTO: 77 % (ref 42–75)
PLATELET # BLD AUTO: 420 10X3/UL (ref 130–400)
POTASSIUM SERPL-SCNC: 3.6 MMOL/L (ref 3.5–5.1)
RBC # BLD AUTO: 2.94 MILL/UL (ref 4.7–6.1)
SODIUM SERPL-SCNC: 135 MMOL/L (ref 136–145)
VANCOMYCIN SERPL-MCNC: 18.6 UG/ML
WBC # BLD AUTO: 10.8 10X3/UL (ref 4.8–10.8)

## 2023-02-24 RX ADMIN — GUAIFENESIN AND DEXTROMETHORPHAN SCH ML: 100; 10 SYRUP ORAL at 10:49

## 2023-02-24 RX ADMIN — HEPARIN SODIUM SCH UNITS: 5000 INJECTION, SOLUTION INTRAVENOUS; SUBCUTANEOUS at 16:28

## 2023-02-24 RX ADMIN — Medication SCH ML: at 10:49

## 2023-02-24 RX ADMIN — ASPIRIN 81 MG CHEWABLE TABLET SCH MG: 81 TABLET CHEWABLE at 10:09

## 2023-02-24 RX ADMIN — GUAIFENESIN AND DEXTROMETHORPHAN SCH ML: 100; 10 SYRUP ORAL at 21:14

## 2023-02-24 RX ADMIN — Medication SCH ML: at 21:15

## 2023-02-24 RX ADMIN — IPRATROPIUM BROMIDE SCH ML: 0.5 SOLUTION RESPIRATORY (INHALATION) at 06:59

## 2023-02-24 RX ADMIN — GUAIFENESIN AND DEXTROMETHORPHAN SCH ML: 100; 10 SYRUP ORAL at 03:56

## 2023-02-24 RX ADMIN — HEPARIN SODIUM SCH UNITS: 5000 INJECTION, SOLUTION INTRAVENOUS; SUBCUTANEOUS at 10:09

## 2023-02-24 RX ADMIN — LANSOPRAZOLE SCH MG: KIT at 10:08

## 2023-02-24 RX ADMIN — GUAIFENESIN AND DEXTROMETHORPHAN SCH ML: 100; 10 SYRUP ORAL at 16:28

## 2023-02-24 RX ADMIN — CEFEPIME HYDROCHLORIDE SCH MLS: 1 INJECTION, POWDER, FOR SOLUTION INTRAMUSCULAR; INTRAVENOUS at 03:56

## 2023-02-24 RX ADMIN — IPRATROPIUM BROMIDE SCH ML: 0.5 SOLUTION RESPIRATORY (INHALATION) at 18:40

## 2023-02-24 RX ADMIN — EPOETIN ALFA-EPBX SCH UNIT: 10000 INJECTION, SOLUTION INTRAVENOUS; SUBCUTANEOUS at 11:03

## 2023-02-24 RX ADMIN — IPRATROPIUM BROMIDE SCH ML: 0.5 SOLUTION RESPIRATORY (INHALATION) at 13:18

## 2023-02-24 RX ADMIN — HEPARIN SODIUM SCH UNITS: 5000 INJECTION, SOLUTION INTRAVENOUS; SUBCUTANEOUS at 21:15

## 2023-02-25 LAB
ANION GAP SERPL CALC-SCNC: 9 MMOL/L (ref 10–20)
BUN SERPL-MCNC: 19 MG/DL (ref 8.4–25.7)
CALCIUM SERPL-MCNC: 8.4 MG/DL (ref 7.8–10.44)
CHLORIDE SERPL-SCNC: 99 MMOL/L (ref 98–107)
CO2 SERPL-SCNC: 30 MMOL/L (ref 23–31)
CREAT CL PREDICTED SERPL C-G-VRATE: 29 ML/MIN (ref 70–130)
GLUCOSE SERPL-MCNC: 130 MG/DL (ref 80–115)
POTASSIUM SERPL-SCNC: 3.4 MMOL/L (ref 3.5–5.1)
SODIUM SERPL-SCNC: 135 MMOL/L (ref 136–145)

## 2023-02-25 RX ADMIN — ASPIRIN 81 MG CHEWABLE TABLET SCH MG: 81 TABLET CHEWABLE at 10:04

## 2023-02-25 RX ADMIN — IPRATROPIUM BROMIDE SCH ML: 0.5 SOLUTION RESPIRATORY (INHALATION) at 07:24

## 2023-02-25 RX ADMIN — HEPARIN SODIUM SCH UNITS: 5000 INJECTION, SOLUTION INTRAVENOUS; SUBCUTANEOUS at 21:07

## 2023-02-25 RX ADMIN — SCOPALAMINE SCH MG: 1 PATCH, EXTENDED RELEASE TRANSDERMAL at 10:03

## 2023-02-25 RX ADMIN — HEPARIN SODIUM SCH UNITS: 5000 INJECTION, SOLUTION INTRAVENOUS; SUBCUTANEOUS at 10:05

## 2023-02-25 RX ADMIN — GUAIFENESIN AND DEXTROMETHORPHAN SCH ML: 100; 10 SYRUP ORAL at 04:18

## 2023-02-25 RX ADMIN — HEPARIN SODIUM SCH UNITS: 5000 INJECTION, SOLUTION INTRAVENOUS; SUBCUTANEOUS at 16:39

## 2023-02-25 RX ADMIN — LANSOPRAZOLE SCH MG: KIT at 10:04

## 2023-02-25 RX ADMIN — GUAIFENESIN AND DEXTROMETHORPHAN SCH ML: 100; 10 SYRUP ORAL at 21:07

## 2023-02-25 RX ADMIN — IPRATROPIUM BROMIDE SCH ML: 0.5 SOLUTION RESPIRATORY (INHALATION) at 01:08

## 2023-02-25 RX ADMIN — GUAIFENESIN AND DEXTROMETHORPHAN SCH ML: 100; 10 SYRUP ORAL at 10:04

## 2023-02-25 RX ADMIN — Medication SCH ML: at 10:05

## 2023-02-25 RX ADMIN — GUAIFENESIN AND DEXTROMETHORPHAN SCH ML: 100; 10 SYRUP ORAL at 16:42

## 2023-02-25 RX ADMIN — IPRATROPIUM BROMIDE SCH ML: 0.5 SOLUTION RESPIRATORY (INHALATION) at 19:33

## 2023-02-25 RX ADMIN — IPRATROPIUM BROMIDE SCH ML: 0.5 SOLUTION RESPIRATORY (INHALATION) at 13:57

## 2023-02-25 RX ADMIN — Medication SCH ML: at 21:08

## 2023-02-26 LAB
ANION GAP SERPL CALC-SCNC: 15 MMOL/L (ref 10–20)
BASOPHILS # BLD AUTO: 0 THOU/UL (ref 0–0.2)
BASOPHILS NFR BLD AUTO: 0.3 % (ref 0–1)
BUN SERPL-MCNC: 26 MG/DL (ref 8.4–25.7)
CALCIUM SERPL-MCNC: 8.6 MG/DL (ref 7.8–10.44)
CHLORIDE SERPL-SCNC: 97 MMOL/L (ref 98–107)
CO2 SERPL-SCNC: 24 MMOL/L (ref 23–31)
CREAT CL PREDICTED SERPL C-G-VRATE: 22 ML/MIN (ref 70–130)
EOSINOPHIL # BLD AUTO: 0.1 THOU/UL (ref 0–0.7)
EOSINOPHIL NFR BLD AUTO: 1.1 % (ref 0–10)
GLUCOSE SERPL-MCNC: 127 MG/DL (ref 80–115)
HGB BLD-MCNC: 7.5 G/DL (ref 14–18)
LYMPHOCYTES # BLD: 1.5 THOU/UL (ref 1.2–3.4)
LYMPHOCYTES NFR BLD AUTO: 11.2 % (ref 21–51)
MAGNESIUM SERPL-MCNC: 1.8 MG/DL (ref 1.6–2.6)
MCH RBC QN AUTO: 25.1 PG (ref 27–31)
MCV RBC AUTO: 83.1 FL (ref 78–98)
MONOCYTES # BLD AUTO: 1.1 THOU/UL (ref 0.11–0.59)
MONOCYTES NFR BLD AUTO: 8.5 % (ref 0–10)
NEUTROPHILS # BLD AUTO: 10.3 THOU/UL (ref 1.4–6.5)
NEUTROPHILS NFR BLD AUTO: 79 % (ref 42–75)
PLATELET # BLD AUTO: 403 10X3/UL (ref 130–400)
POTASSIUM SERPL-SCNC: 4 MMOL/L (ref 3.5–5.1)
RBC # BLD AUTO: 2.98 MILL/UL (ref 4.7–6.1)
SODIUM SERPL-SCNC: 132 MMOL/L (ref 136–145)
WBC # BLD AUTO: 13 10X3/UL (ref 4.8–10.8)

## 2023-02-26 RX ADMIN — IPRATROPIUM BROMIDE SCH ML: 0.5 SOLUTION RESPIRATORY (INHALATION) at 19:00

## 2023-02-26 RX ADMIN — ASPIRIN 81 MG CHEWABLE TABLET SCH MG: 81 TABLET CHEWABLE at 09:26

## 2023-02-26 RX ADMIN — Medication SCH ML: at 22:05

## 2023-02-26 RX ADMIN — GUAIFENESIN AND DEXTROMETHORPHAN SCH ML: 100; 10 SYRUP ORAL at 09:29

## 2023-02-26 RX ADMIN — HEPARIN SODIUM SCH UNITS: 5000 INJECTION, SOLUTION INTRAVENOUS; SUBCUTANEOUS at 09:26

## 2023-02-26 RX ADMIN — IPRATROPIUM BROMIDE SCH ML: 0.5 SOLUTION RESPIRATORY (INHALATION) at 07:05

## 2023-02-26 RX ADMIN — IPRATROPIUM BROMIDE SCH ML: 0.5 SOLUTION RESPIRATORY (INHALATION) at 11:53

## 2023-02-26 RX ADMIN — Medication SCH ML: at 09:34

## 2023-02-26 RX ADMIN — LANSOPRAZOLE SCH MG: KIT at 09:25

## 2023-02-26 RX ADMIN — GUAIFENESIN AND DEXTROMETHORPHAN SCH ML: 100; 10 SYRUP ORAL at 14:32

## 2023-02-26 RX ADMIN — GUAIFENESIN AND DEXTROMETHORPHAN SCH ML: 100; 10 SYRUP ORAL at 22:04

## 2023-02-26 RX ADMIN — GUAIFENESIN AND DEXTROMETHORPHAN SCH: 100; 10 SYRUP ORAL at 06:33

## 2023-02-26 RX ADMIN — HEPARIN SODIUM SCH UNITS: 5000 INJECTION, SOLUTION INTRAVENOUS; SUBCUTANEOUS at 22:04

## 2023-02-26 RX ADMIN — IPRATROPIUM BROMIDE SCH ML: 0.5 SOLUTION RESPIRATORY (INHALATION) at 01:28

## 2023-02-26 RX ADMIN — HEPARIN SODIUM SCH UNITS: 5000 INJECTION, SOLUTION INTRAVENOUS; SUBCUTANEOUS at 14:33

## 2023-02-27 LAB
ANION GAP SERPL CALC-SCNC: 13 MMOL/L (ref 10–20)
BUN SERPL-MCNC: 33 MG/DL (ref 8.4–25.7)
CALCIUM SERPL-MCNC: 8.4 MG/DL (ref 7.8–10.44)
CHLORIDE SERPL-SCNC: 96 MMOL/L (ref 98–107)
CO2 SERPL-SCNC: 26 MMOL/L (ref 23–31)
CREAT CL PREDICTED SERPL C-G-VRATE: 17 ML/MIN (ref 70–130)
GLUCOSE SERPL-MCNC: 119 MG/DL (ref 80–115)
POTASSIUM SERPL-SCNC: 3.7 MMOL/L (ref 3.5–5.1)
SODIUM SERPL-SCNC: 131 MMOL/L (ref 136–145)

## 2023-02-27 RX ADMIN — IPRATROPIUM BROMIDE SCH ML: 0.5 SOLUTION RESPIRATORY (INHALATION) at 06:49

## 2023-02-27 RX ADMIN — IPRATROPIUM BROMIDE SCH ML: 0.5 SOLUTION RESPIRATORY (INHALATION) at 00:39

## 2023-02-27 RX ADMIN — GUAIFENESIN AND DEXTROMETHORPHAN SCH ML: 100; 10 SYRUP ORAL at 06:11

## 2023-02-27 RX ADMIN — GUAIFENESIN AND DEXTROMETHORPHAN SCH ML: 100; 10 SYRUP ORAL at 14:29

## 2023-02-27 RX ADMIN — EPOETIN ALFA-EPBX SCH: 10000 INJECTION, SOLUTION INTRAVENOUS; SUBCUTANEOUS at 14:29

## 2023-02-27 RX ADMIN — GUAIFENESIN AND DEXTROMETHORPHAN SCH ML: 100; 10 SYRUP ORAL at 22:16

## 2023-02-27 RX ADMIN — HEPARIN SODIUM SCH UNITS: 5000 INJECTION, SOLUTION INTRAVENOUS; SUBCUTANEOUS at 23:01

## 2023-02-27 RX ADMIN — GUAIFENESIN AND DEXTROMETHORPHAN SCH: 100; 10 SYRUP ORAL at 10:34

## 2023-02-27 RX ADMIN — IPRATROPIUM BROMIDE SCH ML: 0.5 SOLUTION RESPIRATORY (INHALATION) at 22:21

## 2023-02-27 RX ADMIN — ASPIRIN 81 MG CHEWABLE TABLET SCH MG: 81 TABLET CHEWABLE at 10:14

## 2023-02-27 RX ADMIN — Medication SCH ML: at 22:17

## 2023-02-27 RX ADMIN — Medication SCH ML: at 10:36

## 2023-02-27 RX ADMIN — HEPARIN SODIUM SCH UNITS: 5000 INJECTION, SOLUTION INTRAVENOUS; SUBCUTANEOUS at 14:28

## 2023-02-27 RX ADMIN — LANSOPRAZOLE SCH MG: KIT at 10:16

## 2023-02-27 RX ADMIN — IPRATROPIUM BROMIDE SCH ML: 0.5 SOLUTION RESPIRATORY (INHALATION) at 12:41

## 2023-02-27 RX ADMIN — HEPARIN SODIUM SCH UNITS: 5000 INJECTION, SOLUTION INTRAVENOUS; SUBCUTANEOUS at 10:15

## 2023-02-28 LAB
ANION GAP SERPL CALC-SCNC: 15 MMOL/L (ref 10–20)
BUN SERPL-MCNC: 22 MG/DL (ref 8.4–25.7)
CALCIUM SERPL-MCNC: 8.5 MG/DL (ref 7.8–10.44)
CHLORIDE SERPL-SCNC: 97 MMOL/L (ref 98–107)
CO2 SERPL-SCNC: 27 MMOL/L (ref 23–31)
CREAT CL PREDICTED SERPL C-G-VRATE: 25 ML/MIN (ref 70–130)
GLUCOSE SERPL-MCNC: 123 MG/DL (ref 80–115)
POTASSIUM SERPL-SCNC: 3.6 MMOL/L (ref 3.5–5.1)
SODIUM SERPL-SCNC: 135 MMOL/L (ref 136–145)

## 2023-02-28 PROCEDURE — 0B21XFZ CHANGE TRACHEOSTOMY DEVICE IN TRACHEA, EXTERNAL APPROACH: ICD-10-PCS | Performed by: INTERNAL MEDICINE

## 2023-02-28 RX ADMIN — GUAIFENESIN AND DEXTROMETHORPHAN SCH ML: 100; 10 SYRUP ORAL at 21:15

## 2023-02-28 RX ADMIN — GUAIFENESIN AND DEXTROMETHORPHAN SCH ML: 100; 10 SYRUP ORAL at 06:07

## 2023-02-28 RX ADMIN — LANSOPRAZOLE SCH MG: KIT at 08:47

## 2023-02-28 RX ADMIN — HEPARIN SODIUM SCH UNITS: 5000 INJECTION, SOLUTION INTRAVENOUS; SUBCUTANEOUS at 08:51

## 2023-02-28 RX ADMIN — ASPIRIN 81 MG CHEWABLE TABLET SCH MG: 81 TABLET CHEWABLE at 08:52

## 2023-02-28 RX ADMIN — GUAIFENESIN AND DEXTROMETHORPHAN SCH ML: 100; 10 SYRUP ORAL at 08:52

## 2023-02-28 RX ADMIN — HEPARIN SODIUM SCH UNITS: 5000 INJECTION, SOLUTION INTRAVENOUS; SUBCUTANEOUS at 14:35

## 2023-02-28 RX ADMIN — HEPARIN SODIUM SCH UNITS: 5000 INJECTION, SOLUTION INTRAVENOUS; SUBCUTANEOUS at 21:15

## 2023-02-28 RX ADMIN — GUAIFENESIN AND DEXTROMETHORPHAN SCH ML: 100; 10 SYRUP ORAL at 14:33

## 2023-02-28 RX ADMIN — IPRATROPIUM BROMIDE SCH ML: 0.5 SOLUTION RESPIRATORY (INHALATION) at 23:13

## 2023-02-28 RX ADMIN — IPRATROPIUM BROMIDE SCH ML: 0.5 SOLUTION RESPIRATORY (INHALATION) at 02:12

## 2023-02-28 RX ADMIN — Medication SCH ML: at 21:16

## 2023-02-28 RX ADMIN — IPRATROPIUM BROMIDE SCH ML: 0.5 SOLUTION RESPIRATORY (INHALATION) at 19:49

## 2023-02-28 RX ADMIN — SCOPALAMINE SCH MG: 1 PATCH, EXTENDED RELEASE TRANSDERMAL at 08:52

## 2023-02-28 RX ADMIN — Medication SCH ML: at 09:43

## 2023-02-28 RX ADMIN — IPRATROPIUM BROMIDE SCH ML: 0.5 SOLUTION RESPIRATORY (INHALATION) at 06:50

## 2023-02-28 RX ADMIN — IPRATROPIUM BROMIDE SCH ML: 0.5 SOLUTION RESPIRATORY (INHALATION) at 13:25

## 2023-03-01 LAB
ANION GAP SERPL CALC-SCNC: 12 MMOL/L (ref 10–20)
BUN SERPL-MCNC: 31 MG/DL (ref 8.4–25.7)
CALCIUM SERPL-MCNC: 8.5 MG/DL (ref 7.8–10.44)
CHLORIDE SERPL-SCNC: 96 MMOL/L (ref 98–107)
CO2 SERPL-SCNC: 28 MMOL/L (ref 23–31)
CREAT CL PREDICTED SERPL C-G-VRATE: 18 ML/MIN (ref 70–130)
GLUCOSE SERPL-MCNC: 146 MG/DL (ref 80–115)
POTASSIUM SERPL-SCNC: 3.6 MMOL/L (ref 3.5–5.1)
SODIUM SERPL-SCNC: 132 MMOL/L (ref 136–145)

## 2023-03-01 RX ADMIN — HEPARIN SODIUM SCH UNITS: 5000 INJECTION, SOLUTION INTRAVENOUS; SUBCUTANEOUS at 15:02

## 2023-03-01 RX ADMIN — IPRATROPIUM BROMIDE SCH ML: 0.5 SOLUTION RESPIRATORY (INHALATION) at 23:49

## 2023-03-01 RX ADMIN — HEPARIN SODIUM SCH UNITS: 5000 INJECTION, SOLUTION INTRAVENOUS; SUBCUTANEOUS at 08:08

## 2023-03-01 RX ADMIN — ASPIRIN 81 MG CHEWABLE TABLET SCH MG: 81 TABLET CHEWABLE at 08:08

## 2023-03-01 RX ADMIN — EPOETIN ALFA-EPBX SCH UNIT: 10000 INJECTION, SOLUTION INTRAVENOUS; SUBCUTANEOUS at 15:01

## 2023-03-01 RX ADMIN — HEPARIN SODIUM SCH UNITS: 5000 INJECTION, SOLUTION INTRAVENOUS; SUBCUTANEOUS at 21:14

## 2023-03-01 RX ADMIN — GUAIFENESIN AND DEXTROMETHORPHAN SCH ML: 100; 10 SYRUP ORAL at 21:14

## 2023-03-01 RX ADMIN — Medication SCH ML: at 21:15

## 2023-03-01 RX ADMIN — Medication SCH ML: at 08:08

## 2023-03-01 RX ADMIN — GUAIFENESIN AND DEXTROMETHORPHAN SCH ML: 100; 10 SYRUP ORAL at 15:01

## 2023-03-01 RX ADMIN — GUAIFENESIN AND DEXTROMETHORPHAN SCH ML: 100; 10 SYRUP ORAL at 03:25

## 2023-03-01 RX ADMIN — LANSOPRAZOLE SCH MG: KIT at 08:07

## 2023-03-01 RX ADMIN — IPRATROPIUM BROMIDE SCH ML: 0.5 SOLUTION RESPIRATORY (INHALATION) at 06:27

## 2023-03-01 RX ADMIN — IPRATROPIUM BROMIDE SCH ML: 0.5 SOLUTION RESPIRATORY (INHALATION) at 12:55

## 2023-03-01 RX ADMIN — GUAIFENESIN AND DEXTROMETHORPHAN SCH ML: 100; 10 SYRUP ORAL at 08:08

## 2023-03-01 RX ADMIN — IPRATROPIUM BROMIDE SCH ML: 0.5 SOLUTION RESPIRATORY (INHALATION) at 18:44

## 2023-03-02 LAB
ANION GAP SERPL CALC-SCNC: 12 MMOL/L (ref 10–20)
BASOPHILS # BLD AUTO: 0 THOU/UL (ref 0–0.2)
BASOPHILS NFR BLD AUTO: 0.4 % (ref 0–1)
BUN SERPL-MCNC: 26 MG/DL (ref 8.4–25.7)
CALCIUM SERPL-MCNC: 8.4 MG/DL (ref 7.8–10.44)
CHLORIDE SERPL-SCNC: 97 MMOL/L (ref 98–107)
CO2 SERPL-SCNC: 29 MMOL/L (ref 23–31)
CREAT CL PREDICTED SERPL C-G-VRATE: 21 ML/MIN (ref 70–130)
EOSINOPHIL # BLD AUTO: 0.1 THOU/UL (ref 0–0.7)
EOSINOPHIL NFR BLD AUTO: 1 % (ref 0–10)
GLUCOSE SERPL-MCNC: 130 MG/DL (ref 80–115)
HGB BLD-MCNC: 7.1 G/DL (ref 14–18)
LYMPHOCYTES # BLD: 1.3 THOU/UL (ref 1.2–3.4)
LYMPHOCYTES NFR BLD AUTO: 11.7 % (ref 21–51)
MCH RBC QN AUTO: 25.1 PG (ref 27–31)
MCV RBC AUTO: 82.2 FL (ref 78–98)
MONOCYTES # BLD AUTO: 1.1 THOU/UL (ref 0.11–0.59)
MONOCYTES NFR BLD AUTO: 9.9 % (ref 0–10)
NEUTROPHILS # BLD AUTO: 8.6 THOU/UL (ref 1.4–6.5)
NEUTROPHILS NFR BLD AUTO: 77 % (ref 42–75)
PLATELET # BLD AUTO: 294 10X3/UL (ref 130–400)
POTASSIUM SERPL-SCNC: 3.7 MMOL/L (ref 3.5–5.1)
RBC # BLD AUTO: 2.84 MILL/UL (ref 4.7–6.1)
SODIUM SERPL-SCNC: 134 MMOL/L (ref 136–145)
WBC # BLD AUTO: 11.2 10X3/UL (ref 4.8–10.8)

## 2023-03-02 RX ADMIN — HEPARIN SODIUM SCH UNITS: 5000 INJECTION, SOLUTION INTRAVENOUS; SUBCUTANEOUS at 07:59

## 2023-03-02 RX ADMIN — Medication SCH ML: at 21:32

## 2023-03-02 RX ADMIN — Medication SCH ML: at 07:59

## 2023-03-02 RX ADMIN — ASPIRIN 81 MG CHEWABLE TABLET SCH MG: 81 TABLET CHEWABLE at 07:59

## 2023-03-02 RX ADMIN — LANSOPRAZOLE SCH MG: KIT at 07:59

## 2023-03-02 RX ADMIN — HEPARIN SODIUM SCH UNITS: 5000 INJECTION, SOLUTION INTRAVENOUS; SUBCUTANEOUS at 15:54

## 2023-03-02 RX ADMIN — IPRATROPIUM BROMIDE SCH ML: 0.5 SOLUTION RESPIRATORY (INHALATION) at 06:36

## 2023-03-02 RX ADMIN — GUAIFENESIN AND DEXTROMETHORPHAN SCH ML: 100; 10 SYRUP ORAL at 15:54

## 2023-03-02 RX ADMIN — IPRATROPIUM BROMIDE SCH ML: 0.5 SOLUTION RESPIRATORY (INHALATION) at 13:42

## 2023-03-02 RX ADMIN — HEPARIN SODIUM SCH UNITS: 5000 INJECTION, SOLUTION INTRAVENOUS; SUBCUTANEOUS at 21:32

## 2023-03-02 RX ADMIN — GUAIFENESIN AND DEXTROMETHORPHAN SCH ML: 100; 10 SYRUP ORAL at 03:41

## 2023-03-02 RX ADMIN — IPRATROPIUM BROMIDE SCH ML: 0.5 SOLUTION RESPIRATORY (INHALATION) at 18:31

## 2023-03-02 RX ADMIN — GUAIFENESIN AND DEXTROMETHORPHAN SCH ML: 100; 10 SYRUP ORAL at 21:32

## 2023-03-02 RX ADMIN — GUAIFENESIN AND DEXTROMETHORPHAN SCH ML: 100; 10 SYRUP ORAL at 07:58

## 2023-03-02 RX ADMIN — IPRATROPIUM BROMIDE SCH ML: 0.5 SOLUTION RESPIRATORY (INHALATION) at 23:00

## 2023-03-03 LAB
ANION GAP SERPL CALC-SCNC: 12 MMOL/L (ref 10–20)
BUN SERPL-MCNC: 33 MG/DL (ref 8.4–25.7)
CALCIUM SERPL-MCNC: 8.5 MG/DL (ref 7.8–10.44)
CHLORIDE SERPL-SCNC: 95 MMOL/L (ref 98–107)
CO2 SERPL-SCNC: 28 MMOL/L (ref 23–31)
CREAT CL PREDICTED SERPL C-G-VRATE: 18 ML/MIN (ref 70–130)
GLUCOSE SERPL-MCNC: 128 MG/DL (ref 80–115)
HBSAG INDEX: 0.26 S/CO (ref 0–0.99)
HBV SURFACE AB SERPL IA-ACNC: (no result) MIU/ML
POTASSIUM SERPL-SCNC: 3.5 MMOL/L (ref 3.5–5.1)
SODIUM SERPL-SCNC: 131 MMOL/L (ref 136–145)

## 2023-03-03 PROCEDURE — 30233N1 TRANSFUSION OF NONAUTOLOGOUS RED BLOOD CELLS INTO PERIPHERAL VEIN, PERCUTANEOUS APPROACH: ICD-10-PCS | Performed by: INTERNAL MEDICINE

## 2023-03-03 RX ADMIN — ASPIRIN 81 MG CHEWABLE TABLET SCH MG: 81 TABLET CHEWABLE at 09:01

## 2023-03-03 RX ADMIN — LANSOPRAZOLE SCH MG: KIT at 09:58

## 2023-03-03 RX ADMIN — EPOETIN ALFA-EPBX SCH: 10000 INJECTION, SOLUTION INTRAVENOUS; SUBCUTANEOUS at 18:14

## 2023-03-03 RX ADMIN — GUAIFENESIN AND DEXTROMETHORPHAN SCH ML: 100; 10 SYRUP ORAL at 02:53

## 2023-03-03 RX ADMIN — HEPARIN SODIUM SCH UNITS: 5000 INJECTION, SOLUTION INTRAVENOUS; SUBCUTANEOUS at 09:01

## 2023-03-03 RX ADMIN — IPRATROPIUM BROMIDE SCH ML: 0.5 SOLUTION RESPIRATORY (INHALATION) at 13:10

## 2023-03-03 RX ADMIN — HEPARIN SODIUM SCH UNITS: 5000 INJECTION, SOLUTION INTRAVENOUS; SUBCUTANEOUS at 21:45

## 2023-03-03 RX ADMIN — GUAIFENESIN AND DEXTROMETHORPHAN SCH: 100; 10 SYRUP ORAL at 15:06

## 2023-03-03 RX ADMIN — Medication SCH ML: at 09:02

## 2023-03-03 RX ADMIN — SCOPALAMINE SCH MG: 1 PATCH, EXTENDED RELEASE TRANSDERMAL at 09:59

## 2023-03-03 RX ADMIN — Medication SCH ML: at 21:45

## 2023-03-03 RX ADMIN — GUAIFENESIN AND DEXTROMETHORPHAN SCH ML: 100; 10 SYRUP ORAL at 21:45

## 2023-03-03 RX ADMIN — GUAIFENESIN AND DEXTROMETHORPHAN SCH ML: 100; 10 SYRUP ORAL at 09:58

## 2023-03-03 RX ADMIN — IPRATROPIUM BROMIDE SCH: 0.5 SOLUTION RESPIRATORY (INHALATION) at 18:47

## 2023-03-03 RX ADMIN — HEPARIN SODIUM SCH: 5000 INJECTION, SOLUTION INTRAVENOUS; SUBCUTANEOUS at 15:06

## 2023-03-03 RX ADMIN — IPRATROPIUM BROMIDE SCH ML: 0.5 SOLUTION RESPIRATORY (INHALATION) at 07:50

## 2023-03-04 LAB
ANION GAP SERPL CALC-SCNC: 14 MMOL/L (ref 10–20)
BASOPHILS # BLD AUTO: 0.1 THOU/UL (ref 0–0.2)
BASOPHILS NFR BLD AUTO: 0.4 % (ref 0–1)
BUN SERPL-MCNC: 25 MG/DL (ref 8.4–25.7)
CALCIUM SERPL-MCNC: 8.7 MG/DL (ref 7.8–10.44)
CHLORIDE SERPL-SCNC: 96 MMOL/L (ref 98–107)
CO2 SERPL-SCNC: 28 MMOL/L (ref 23–31)
CREAT CL PREDICTED SERPL C-G-VRATE: 24 ML/MIN (ref 70–130)
EOSINOPHIL # BLD AUTO: 0 THOU/UL (ref 0–0.7)
EOSINOPHIL NFR BLD AUTO: 0.1 % (ref 0–10)
GLUCOSE SERPL-MCNC: 153 MG/DL (ref 80–115)
HGB BLD-MCNC: 8.6 G/DL (ref 14–18)
LYMPHOCYTES # BLD: 1.4 THOU/UL (ref 1.2–3.4)
LYMPHOCYTES NFR BLD AUTO: 11.3 % (ref 21–51)
MCH RBC QN AUTO: 25.1 PG (ref 27–31)
MCV RBC AUTO: 83.4 FL (ref 78–98)
MONOCYTES # BLD AUTO: 0.9 THOU/UL (ref 0.11–0.59)
MONOCYTES NFR BLD AUTO: 7.7 % (ref 0–10)
NEUTROPHILS # BLD AUTO: 9.8 THOU/UL (ref 1.4–6.5)
NEUTROPHILS NFR BLD AUTO: 80.5 % (ref 42–75)
PLATELET # BLD AUTO: 305 10X3/UL (ref 130–400)
POTASSIUM SERPL-SCNC: 4 MMOL/L (ref 3.5–5.1)
RBC # BLD AUTO: 3.42 MILL/UL (ref 4.7–6.1)
SODIUM SERPL-SCNC: 134 MMOL/L (ref 136–145)
WBC # BLD AUTO: 12.2 10X3/UL (ref 4.8–10.8)

## 2023-03-04 RX ADMIN — Medication SCH ML: at 21:19

## 2023-03-04 RX ADMIN — LANSOPRAZOLE SCH MG: KIT at 09:09

## 2023-03-04 RX ADMIN — IPRATROPIUM BROMIDE SCH ML: 0.5 SOLUTION RESPIRATORY (INHALATION) at 00:58

## 2023-03-04 RX ADMIN — HEPARIN SODIUM SCH UNITS: 5000 INJECTION, SOLUTION INTRAVENOUS; SUBCUTANEOUS at 16:06

## 2023-03-04 RX ADMIN — ASPIRIN 81 MG CHEWABLE TABLET SCH MG: 81 TABLET CHEWABLE at 09:09

## 2023-03-04 RX ADMIN — IPRATROPIUM BROMIDE SCH ML: 0.5 SOLUTION RESPIRATORY (INHALATION) at 12:39

## 2023-03-04 RX ADMIN — HEPARIN SODIUM SCH UNITS: 5000 INJECTION, SOLUTION INTRAVENOUS; SUBCUTANEOUS at 21:18

## 2023-03-04 RX ADMIN — HEPARIN SODIUM SCH UNITS: 5000 INJECTION, SOLUTION INTRAVENOUS; SUBCUTANEOUS at 09:10

## 2023-03-04 RX ADMIN — GUAIFENESIN AND DEXTROMETHORPHAN SCH ML: 100; 10 SYRUP ORAL at 16:05

## 2023-03-04 RX ADMIN — GUAIFENESIN AND DEXTROMETHORPHAN SCH ML: 100; 10 SYRUP ORAL at 09:08

## 2023-03-04 RX ADMIN — Medication SCH ML: at 09:11

## 2023-03-04 RX ADMIN — IPRATROPIUM BROMIDE SCH ML: 0.5 SOLUTION RESPIRATORY (INHALATION) at 07:23

## 2023-03-04 RX ADMIN — GUAIFENESIN AND DEXTROMETHORPHAN SCH ML: 100; 10 SYRUP ORAL at 03:46

## 2023-03-04 RX ADMIN — IPRATROPIUM BROMIDE SCH ML: 0.5 SOLUTION RESPIRATORY (INHALATION) at 18:25

## 2023-03-04 RX ADMIN — GUAIFENESIN AND DEXTROMETHORPHAN SCH ML: 100; 10 SYRUP ORAL at 21:17

## 2023-03-05 LAB
ANION GAP SERPL CALC-SCNC: 17 MMOL/L (ref 10–20)
BUN SERPL-MCNC: 39 MG/DL (ref 8.4–25.7)
CALCIUM SERPL-MCNC: 8.6 MG/DL (ref 7.8–10.44)
CHLORIDE SERPL-SCNC: 95 MMOL/L (ref 98–107)
CO2 SERPL-SCNC: 26 MMOL/L (ref 23–31)
CREAT CL PREDICTED SERPL C-G-VRATE: 18 ML/MIN (ref 70–130)
GLUCOSE SERPL-MCNC: 154 MG/DL (ref 80–115)
POTASSIUM SERPL-SCNC: 3.8 MMOL/L (ref 3.5–5.1)
SODIUM SERPL-SCNC: 134 MMOL/L (ref 136–145)

## 2023-03-05 RX ADMIN — GUAIFENESIN AND DEXTROMETHORPHAN SCH ML: 100; 10 SYRUP ORAL at 10:08

## 2023-03-05 RX ADMIN — Medication SCH ML: at 14:06

## 2023-03-05 RX ADMIN — Medication SCH ML: at 20:24

## 2023-03-05 RX ADMIN — LANSOPRAZOLE SCH MG: KIT at 10:07

## 2023-03-05 RX ADMIN — HEPARIN SODIUM SCH UNITS: 5000 INJECTION, SOLUTION INTRAVENOUS; SUBCUTANEOUS at 16:17

## 2023-03-05 RX ADMIN — IPRATROPIUM BROMIDE SCH ML: 0.5 SOLUTION RESPIRATORY (INHALATION) at 12:07

## 2023-03-05 RX ADMIN — ASPIRIN 81 MG CHEWABLE TABLET SCH MG: 81 TABLET CHEWABLE at 10:07

## 2023-03-05 RX ADMIN — HEPARIN SODIUM SCH UNITS: 5000 INJECTION, SOLUTION INTRAVENOUS; SUBCUTANEOUS at 20:23

## 2023-03-05 RX ADMIN — GUAIFENESIN AND DEXTROMETHORPHAN SCH ML: 100; 10 SYRUP ORAL at 04:08

## 2023-03-05 RX ADMIN — HEPARIN SODIUM SCH UNITS: 5000 INJECTION, SOLUTION INTRAVENOUS; SUBCUTANEOUS at 10:09

## 2023-03-05 RX ADMIN — GUAIFENESIN AND DEXTROMETHORPHAN SCH ML: 100; 10 SYRUP ORAL at 20:23

## 2023-03-05 RX ADMIN — IPRATROPIUM BROMIDE SCH ML: 0.5 SOLUTION RESPIRATORY (INHALATION) at 07:47

## 2023-03-05 RX ADMIN — GUAIFENESIN AND DEXTROMETHORPHAN SCH ML: 100; 10 SYRUP ORAL at 16:17

## 2023-03-05 RX ADMIN — IPRATROPIUM BROMIDE SCH ML: 0.5 SOLUTION RESPIRATORY (INHALATION) at 18:25

## 2023-03-05 RX ADMIN — IPRATROPIUM BROMIDE SCH ML: 0.5 SOLUTION RESPIRATORY (INHALATION) at 00:22

## 2023-03-06 LAB
ANION GAP SERPL CALC-SCNC: 16 MMOL/L (ref 10–20)
BUN SERPL-MCNC: 48 MG/DL (ref 8.4–25.7)
CALCIUM SERPL-MCNC: 8.4 MG/DL (ref 7.8–10.44)
CHLORIDE SERPL-SCNC: 92 MMOL/L (ref 98–107)
CO2 SERPL-SCNC: 28 MMOL/L (ref 23–31)
CREAT CL PREDICTED SERPL C-G-VRATE: 15 ML/MIN (ref 70–130)
GLUCOSE SERPL-MCNC: 134 MG/DL (ref 80–115)
POTASSIUM SERPL-SCNC: 4 MMOL/L (ref 3.5–5.1)
SODIUM SERPL-SCNC: 132 MMOL/L (ref 136–145)

## 2023-03-06 PROCEDURE — 5A1D70Z PERFORMANCE OF URINARY FILTRATION, INTERMITTENT, LESS THAN 6 HOURS PER DAY: ICD-10-PCS | Performed by: INTERNAL MEDICINE

## 2023-03-06 RX ADMIN — HEPARIN SODIUM SCH UNITS: 5000 INJECTION, SOLUTION INTRAVENOUS; SUBCUTANEOUS at 08:24

## 2023-03-06 RX ADMIN — HEPARIN SODIUM SCH UNITS: 5000 INJECTION, SOLUTION INTRAVENOUS; SUBCUTANEOUS at 20:56

## 2023-03-06 RX ADMIN — IPRATROPIUM BROMIDE SCH ML: 0.5 SOLUTION RESPIRATORY (INHALATION) at 18:50

## 2023-03-06 RX ADMIN — Medication SCH ML: at 20:56

## 2023-03-06 RX ADMIN — SCOPALAMINE SCH MG: 1 PATCH, EXTENDED RELEASE TRANSDERMAL at 08:24

## 2023-03-06 RX ADMIN — IPRATROPIUM BROMIDE SCH ML: 0.5 SOLUTION RESPIRATORY (INHALATION) at 14:25

## 2023-03-06 RX ADMIN — ASPIRIN 81 MG CHEWABLE TABLET SCH MG: 81 TABLET CHEWABLE at 08:24

## 2023-03-06 RX ADMIN — HEPARIN SODIUM SCH UNITS: 5000 INJECTION, SOLUTION INTRAVENOUS; SUBCUTANEOUS at 15:21

## 2023-03-06 RX ADMIN — IPRATROPIUM BROMIDE SCH ML: 0.5 SOLUTION RESPIRATORY (INHALATION) at 00:50

## 2023-03-06 RX ADMIN — GUAIFENESIN AND DEXTROMETHORPHAN SCH ML: 100; 10 SYRUP ORAL at 15:05

## 2023-03-06 RX ADMIN — GUAIFENESIN AND DEXTROMETHORPHAN SCH ML: 100; 10 SYRUP ORAL at 20:55

## 2023-03-06 RX ADMIN — GUAIFENESIN AND DEXTROMETHORPHAN SCH ML: 100; 10 SYRUP ORAL at 09:22

## 2023-03-06 RX ADMIN — IPRATROPIUM BROMIDE SCH ML: 0.5 SOLUTION RESPIRATORY (INHALATION) at 06:58

## 2023-03-06 RX ADMIN — GUAIFENESIN AND DEXTROMETHORPHAN SCH ML: 100; 10 SYRUP ORAL at 04:10

## 2023-03-06 RX ADMIN — Medication SCH ML: at 08:24

## 2023-03-06 RX ADMIN — LANSOPRAZOLE SCH MG: KIT at 08:24

## 2023-03-06 RX ADMIN — EPOETIN ALFA-EPBX SCH UNIT: 10000 INJECTION, SOLUTION INTRAVENOUS; SUBCUTANEOUS at 15:20

## 2023-03-07 LAB
ANION GAP SERPL CALC-SCNC: 15 MMOL/L (ref 10–20)
BUN SERPL-MCNC: 31 MG/DL (ref 8.4–25.7)
CALCIUM SERPL-MCNC: 8.5 MG/DL (ref 7.8–10.44)
CHLORIDE SERPL-SCNC: 96 MMOL/L (ref 98–107)
CO2 SERPL-SCNC: 26 MMOL/L (ref 23–31)
CREAT CL PREDICTED SERPL C-G-VRATE: 20 ML/MIN (ref 70–130)
GLUCOSE SERPL-MCNC: 146 MG/DL (ref 80–115)
POTASSIUM SERPL-SCNC: 3.7 MMOL/L (ref 3.5–5.1)
SODIUM SERPL-SCNC: 133 MMOL/L (ref 136–145)

## 2023-03-07 RX ADMIN — HEPARIN SODIUM SCH UNITS: 5000 INJECTION, SOLUTION INTRAVENOUS; SUBCUTANEOUS at 08:55

## 2023-03-07 RX ADMIN — Medication SCH ML: at 08:56

## 2023-03-07 RX ADMIN — IPRATROPIUM BROMIDE SCH: 0.5 SOLUTION RESPIRATORY (INHALATION) at 14:49

## 2023-03-07 RX ADMIN — LANSOPRAZOLE SCH MG: KIT at 08:55

## 2023-03-07 RX ADMIN — IPRATROPIUM BROMIDE SCH ML: 0.5 SOLUTION RESPIRATORY (INHALATION) at 07:16

## 2023-03-07 RX ADMIN — GUAIFENESIN AND DEXTROMETHORPHAN SCH ML: 100; 10 SYRUP ORAL at 20:38

## 2023-03-07 RX ADMIN — HEPARIN SODIUM SCH UNITS: 5000 INJECTION, SOLUTION INTRAVENOUS; SUBCUTANEOUS at 14:57

## 2023-03-07 RX ADMIN — Medication SCH ML: at 20:25

## 2023-03-07 RX ADMIN — GUAIFENESIN AND DEXTROMETHORPHAN SCH ML: 100; 10 SYRUP ORAL at 17:12

## 2023-03-07 RX ADMIN — IPRATROPIUM BROMIDE SCH ML: 0.5 SOLUTION RESPIRATORY (INHALATION) at 18:50

## 2023-03-07 RX ADMIN — IPRATROPIUM BROMIDE SCH ML: 0.5 SOLUTION RESPIRATORY (INHALATION) at 00:25

## 2023-03-07 RX ADMIN — GUAIFENESIN AND DEXTROMETHORPHAN SCH ML: 100; 10 SYRUP ORAL at 09:03

## 2023-03-07 RX ADMIN — ASPIRIN 81 MG CHEWABLE TABLET SCH MG: 81 TABLET CHEWABLE at 08:55

## 2023-03-07 RX ADMIN — HEPARIN SODIUM SCH UNITS: 5000 INJECTION, SOLUTION INTRAVENOUS; SUBCUTANEOUS at 20:25

## 2023-03-07 RX ADMIN — GUAIFENESIN AND DEXTROMETHORPHAN SCH ML: 100; 10 SYRUP ORAL at 02:44

## 2023-03-08 LAB
ANION GAP SERPL CALC-SCNC: 14 MMOL/L (ref 10–20)
BUN SERPL-MCNC: 42 MG/DL (ref 8.4–25.7)
CALCIUM SERPL-MCNC: 8.2 MG/DL (ref 7.8–10.44)
CHLORIDE SERPL-SCNC: 95 MMOL/L (ref 98–107)
CO2 SERPL-SCNC: 27 MMOL/L (ref 23–31)
CREAT CL PREDICTED SERPL C-G-VRATE: 16 ML/MIN (ref 70–130)
GLUCOSE SERPL-MCNC: 140 MG/DL (ref 80–115)
POTASSIUM SERPL-SCNC: 4.1 MMOL/L (ref 3.5–5.1)
SODIUM SERPL-SCNC: 132 MMOL/L (ref 136–145)

## 2023-03-08 PROCEDURE — 5A1D70Z PERFORMANCE OF URINARY FILTRATION, INTERMITTENT, LESS THAN 6 HOURS PER DAY: ICD-10-PCS | Performed by: INTERNAL MEDICINE

## 2023-03-08 RX ADMIN — HEPARIN SODIUM SCH UNITS: 5000 INJECTION, SOLUTION INTRAVENOUS; SUBCUTANEOUS at 20:16

## 2023-03-08 RX ADMIN — IPRATROPIUM BROMIDE SCH ML: 0.5 SOLUTION RESPIRATORY (INHALATION) at 06:42

## 2023-03-08 RX ADMIN — HEPARIN SODIUM SCH UNITS: 5000 INJECTION, SOLUTION INTRAVENOUS; SUBCUTANEOUS at 08:27

## 2023-03-08 RX ADMIN — Medication SCH ML: at 20:17

## 2023-03-08 RX ADMIN — IPRATROPIUM BROMIDE SCH ML: 0.5 SOLUTION RESPIRATORY (INHALATION) at 19:16

## 2023-03-08 RX ADMIN — ASPIRIN 81 MG CHEWABLE TABLET SCH MG: 81 TABLET CHEWABLE at 08:27

## 2023-03-08 RX ADMIN — HEPARIN SODIUM SCH UNITS: 5000 INJECTION, SOLUTION INTRAVENOUS; SUBCUTANEOUS at 15:35

## 2023-03-08 RX ADMIN — IPRATROPIUM BROMIDE SCH ML: 0.5 SOLUTION RESPIRATORY (INHALATION) at 23:32

## 2023-03-08 RX ADMIN — IPRATROPIUM BROMIDE SCH: 0.5 SOLUTION RESPIRATORY (INHALATION) at 12:16

## 2023-03-08 RX ADMIN — IPRATROPIUM BROMIDE SCH ML: 0.5 SOLUTION RESPIRATORY (INHALATION) at 00:37

## 2023-03-08 RX ADMIN — GUAIFENESIN AND DEXTROMETHORPHAN SCH ML: 100; 10 SYRUP ORAL at 03:55

## 2023-03-08 RX ADMIN — Medication SCH ML: at 08:28

## 2023-03-08 RX ADMIN — GUAIFENESIN AND DEXTROMETHORPHAN SCH ML: 100; 10 SYRUP ORAL at 09:21

## 2023-03-08 RX ADMIN — GUAIFENESIN AND DEXTROMETHORPHAN SCH ML: 100; 10 SYRUP ORAL at 20:17

## 2023-03-08 RX ADMIN — GUAIFENESIN AND DEXTROMETHORPHAN SCH ML: 100; 10 SYRUP ORAL at 16:56

## 2023-03-08 RX ADMIN — EPOETIN ALFA-EPBX SCH UNIT: 10000 INJECTION, SOLUTION INTRAVENOUS; SUBCUTANEOUS at 08:27

## 2023-03-08 RX ADMIN — LANSOPRAZOLE SCH MG: KIT at 08:27

## 2023-03-09 VITALS — TEMPERATURE: 97.7 F | DIASTOLIC BLOOD PRESSURE: 92 MMHG | SYSTOLIC BLOOD PRESSURE: 169 MMHG

## 2023-03-09 LAB
ANION GAP SERPL CALC-SCNC: 18 MMOL/L (ref 10–20)
BASOPHILS # BLD AUTO: 0 THOU/UL (ref 0–0.2)
BASOPHILS NFR BLD AUTO: 0.3 % (ref 0–1)
BUN SERPL-MCNC: 26 MG/DL (ref 8.4–25.7)
CALCIUM SERPL-MCNC: 8.1 MG/DL (ref 7.8–10.44)
CHLORIDE SERPL-SCNC: 94 MMOL/L (ref 98–107)
CO2 SERPL-SCNC: 26 MMOL/L (ref 23–31)
CREAT CL PREDICTED SERPL C-G-VRATE: 23 ML/MIN (ref 70–130)
EOSINOPHIL # BLD AUTO: 0.2 THOU/UL (ref 0–0.7)
EOSINOPHIL NFR BLD AUTO: 2 % (ref 0–10)
GLUCOSE SERPL-MCNC: 130 MG/DL (ref 80–115)
HGB BLD-MCNC: 8.3 G/DL (ref 14–18)
LYMPHOCYTES # BLD: 1.2 THOU/UL (ref 1.2–3.4)
LYMPHOCYTES NFR BLD AUTO: 13 % (ref 21–51)
MCH RBC QN AUTO: 25.7 PG (ref 27–31)
MCV RBC AUTO: 83.5 FL (ref 78–98)
MONOCYTES # BLD AUTO: 0.8 THOU/UL (ref 0.11–0.59)
MONOCYTES NFR BLD AUTO: 8.4 % (ref 0–10)
NEUTROPHILS # BLD AUTO: 7.3 THOU/UL (ref 1.4–6.5)
NEUTROPHILS NFR BLD AUTO: 76.3 % (ref 42–75)
PLATELET # BLD AUTO: 295 10X3/UL (ref 130–400)
POTASSIUM SERPL-SCNC: 3.8 MMOL/L (ref 3.5–5.1)
RBC # BLD AUTO: 3.25 MILL/UL (ref 4.7–6.1)
SODIUM SERPL-SCNC: 134 MMOL/L (ref 136–145)
WBC # BLD AUTO: 9.6 10X3/UL (ref 4.8–10.8)

## 2023-03-09 RX ADMIN — ASPIRIN 81 MG CHEWABLE TABLET SCH MG: 81 TABLET CHEWABLE at 09:05

## 2023-03-09 RX ADMIN — GUAIFENESIN AND DEXTROMETHORPHAN SCH: 100; 10 SYRUP ORAL at 09:29

## 2023-03-09 RX ADMIN — LANSOPRAZOLE SCH MG: KIT at 09:04

## 2023-03-09 RX ADMIN — GUAIFENESIN AND DEXTROMETHORPHAN SCH ML: 100; 10 SYRUP ORAL at 14:29

## 2023-03-09 RX ADMIN — Medication SCH ML: at 09:23

## 2023-03-09 RX ADMIN — SCOPALAMINE SCH MG: 1 PATCH, EXTENDED RELEASE TRANSDERMAL at 09:03

## 2023-03-09 RX ADMIN — IPRATROPIUM BROMIDE SCH: 0.5 SOLUTION RESPIRATORY (INHALATION) at 11:22

## 2023-03-09 RX ADMIN — IPRATROPIUM BROMIDE SCH: 0.5 SOLUTION RESPIRATORY (INHALATION) at 14:42

## 2023-03-09 RX ADMIN — HEPARIN SODIUM SCH UNITS: 5000 INJECTION, SOLUTION INTRAVENOUS; SUBCUTANEOUS at 14:26

## 2023-03-09 RX ADMIN — HEPARIN SODIUM SCH UNITS: 5000 INJECTION, SOLUTION INTRAVENOUS; SUBCUTANEOUS at 09:05

## 2023-03-09 RX ADMIN — GUAIFENESIN AND DEXTROMETHORPHAN SCH ML: 100; 10 SYRUP ORAL at 02:06

## 2023-08-17 ENCOUNTER — HOSPITAL ENCOUNTER (EMERGENCY)
Dept: HOSPITAL 92 - CSHERS | Age: 68
Discharge: HOME | End: 2023-08-17
Payer: MEDICARE

## 2023-08-17 DIAGNOSIS — N18.6: ICD-10-CM

## 2023-08-17 DIAGNOSIS — Z99.2: ICD-10-CM

## 2023-08-17 DIAGNOSIS — I95.9: Primary | ICD-10-CM

## 2023-08-17 LAB
ANION GAP SERPL CALC-SCNC: 18 MMOL/L (ref 10–20)
BASOPHILS # BLD AUTO: 0 10X3/UL (ref 0–0.2)
BASOPHILS NFR BLD AUTO: 0.3 % (ref 0–2)
BUN SERPL-MCNC: 24 MG/DL (ref 8.4–25.7)
CALCIUM SERPL-MCNC: 9.2 MG/DL (ref 7.8–10.44)
CHLORIDE SERPL-SCNC: 100 MMOL/L (ref 98–107)
CO2 SERPL-SCNC: 26 MMOL/L (ref 23–31)
CREAT CL PREDICTED SERPL C-G-VRATE: 0 ML/MIN (ref 70–130)
EOSINOPHIL # BLD AUTO: 0.1 10X3/UL (ref 0–0.5)
EOSINOPHIL NFR BLD AUTO: 2.4 % (ref 0–6)
GLUCOSE SERPL-MCNC: 81 MG/DL (ref 80–115)
HCT VFR BLD CALC: 40.7 % (ref 38.8–50)
HGB BLD-MCNC: 12.7 G/DL (ref 13.5–17.5)
LYMPHOCYTES NFR BLD AUTO: 29.9 % (ref 18–47)
MCH RBC QN AUTO: 27.8 PG (ref 27–33)
MCV RBC AUTO: 89.1 FL (ref 81.2–95.1)
MONOCYTES # BLD AUTO: 0.6 10X3/UL (ref 0–1.1)
MONOCYTES NFR BLD AUTO: 11.2 % (ref 0–10)
NEUTROPHILS # BLD AUTO: 3.2 10X3/UL (ref 1.5–8.4)
NEUTROPHILS NFR BLD AUTO: 56 % (ref 40–75)
PLATELET # BLD AUTO: 142 10X3/UL (ref 150–450)
POTASSIUM SERPL-SCNC: 4 MMOL/L (ref 3.5–5.1)
RBC # BLD AUTO: 4.57 10X6/UL (ref 4.32–5.72)
SODIUM SERPL-SCNC: 140 MMOL/L (ref 136–145)
WBC # BLD AUTO: 5.7 10X3/UL (ref 3.5–10.5)

## 2023-08-17 PROCEDURE — 80048 BASIC METABOLIC PNL TOTAL CA: CPT

## 2023-08-17 PROCEDURE — 93005 ELECTROCARDIOGRAM TRACING: CPT

## 2023-08-17 PROCEDURE — 85025 COMPLETE CBC W/AUTO DIFF WBC: CPT

## 2023-08-31 ENCOUNTER — HOSPITAL ENCOUNTER (OUTPATIENT)
Dept: HOSPITAL 92 - ERS | Age: 68
Setting detail: OBSERVATION
LOS: 2 days | Discharge: HOME | End: 2023-09-02
Attending: FAMILY MEDICINE | Admitting: FAMILY MEDICINE
Payer: MEDICARE

## 2023-08-31 VITALS — BODY MASS INDEX: 24.9 KG/M2

## 2023-08-31 DIAGNOSIS — N18.6: ICD-10-CM

## 2023-08-31 DIAGNOSIS — E83.42: ICD-10-CM

## 2023-08-31 DIAGNOSIS — Z99.2: ICD-10-CM

## 2023-08-31 DIAGNOSIS — E87.20: ICD-10-CM

## 2023-08-31 DIAGNOSIS — E87.6: ICD-10-CM

## 2023-08-31 DIAGNOSIS — Z79.82: ICD-10-CM

## 2023-08-31 DIAGNOSIS — Z86.73: ICD-10-CM

## 2023-08-31 DIAGNOSIS — I13.2: ICD-10-CM

## 2023-08-31 DIAGNOSIS — Z95.810: ICD-10-CM

## 2023-08-31 DIAGNOSIS — I50.22: ICD-10-CM

## 2023-08-31 DIAGNOSIS — I47.1: Primary | ICD-10-CM

## 2023-08-31 LAB
ALBUMIN SERPL BCG-MCNC: 4.2 G/DL (ref 3.4–4.8)
ALP SERPL-CCNC: 107 U/L (ref 40–110)
ALT SERPL W P-5'-P-CCNC: 8 U/L (ref 8–55)
ANION GAP SERPL CALC-SCNC: 19 MMOL/L (ref 10–20)
APTT PPP: 32.4 SEC (ref 22.9–36.1)
AST SERPL-CCNC: 14 U/L (ref 5–34)
BASOPHILS # BLD AUTO: 0 THOU/UL (ref 0–0.2)
BASOPHILS NFR BLD AUTO: 0.3 % (ref 0–1)
BILIRUB SERPL-MCNC: 0.6 MG/DL (ref 0.2–1.2)
BUN SERPL-MCNC: 17 MG/DL (ref 8.4–25.7)
CALCIUM SERPL-MCNC: 9.9 MG/DL (ref 7.8–10.44)
CHLORIDE SERPL-SCNC: 95 MMOL/L (ref 98–107)
CO2 SERPL-SCNC: 27 MMOL/L (ref 23–31)
CREAT CL PREDICTED SERPL C-G-VRATE: 0 ML/MIN (ref 70–130)
EOSINOPHIL # BLD AUTO: 0.1 THOU/UL (ref 0–0.7)
EOSINOPHIL NFR BLD AUTO: 1.5 % (ref 0–10)
GLOBULIN SER CALC-MCNC: 3.9 G/DL (ref 2.4–3.5)
GLUCOSE SERPL-MCNC: 114 MG/DL (ref 80–115)
HCT VFR BLD CALC: 41.9 % (ref 42–52)
HGB BLD-MCNC: 13.4 G/DL (ref 14–18)
INR PPP: 1
LYMPHOCYTES NFR BLD AUTO: 36.3 % (ref 21–51)
MAGNESIUM SERPL-MCNC: 1.8 MG/DL (ref 1.6–2.6)
MCH RBC QN AUTO: 28.6 PG (ref 27–31)
MCV RBC AUTO: 89.3 FL (ref 78–98)
MONOCYTES # BLD AUTO: 0.7 THOU/UL (ref 0.11–0.59)
MONOCYTES NFR BLD AUTO: 11.1 % (ref 0–10)
NEUTROPHILS # BLD AUTO: 3.1 THOU/UL (ref 1.4–6.5)
NEUTROPHILS NFR BLD AUTO: 50.6 % (ref 42–75)
PLATELET # BLD AUTO: 144 10X3/UL (ref 130–400)
POTASSIUM SERPL-SCNC: 3.4 MMOL/L (ref 3.5–5.1)
PROTHROMBIN TIME: 13.5 SEC (ref 12–14.7)
RBC # BLD AUTO: 4.69 MILL/UL (ref 4.7–6.1)
SODIUM SERPL-SCNC: 138 MMOL/L (ref 136–145)
TROPONIN I SERPL DL<=0.01 NG/ML-MCNC: 0.14 NG/ML (ref ?–0.03)
WBC # BLD AUTO: 6.2 10X3/UL (ref 4.8–10.8)

## 2023-08-31 PROCEDURE — 96372 THER/PROPH/DIAG INJ SC/IM: CPT

## 2023-08-31 PROCEDURE — 80048 BASIC METABOLIC PNL TOTAL CA: CPT

## 2023-08-31 PROCEDURE — 85025 COMPLETE CBC W/AUTO DIFF WBC: CPT

## 2023-08-31 PROCEDURE — 87340 HEPATITIS B SURFACE AG IA: CPT

## 2023-08-31 PROCEDURE — 84100 ASSAY OF PHOSPHORUS: CPT

## 2023-08-31 PROCEDURE — 96374 THER/PROPH/DIAG INJ IV PUSH: CPT

## 2023-08-31 PROCEDURE — 80053 COMPREHEN METABOLIC PANEL: CPT

## 2023-08-31 PROCEDURE — 86706 HEP B SURFACE ANTIBODY: CPT

## 2023-08-31 PROCEDURE — 93010 ELECTROCARDIOGRAM REPORT: CPT

## 2023-08-31 PROCEDURE — 84484 ASSAY OF TROPONIN QUANT: CPT

## 2023-08-31 PROCEDURE — 83605 ASSAY OF LACTIC ACID: CPT

## 2023-08-31 PROCEDURE — 93005 ELECTROCARDIOGRAM TRACING: CPT

## 2023-08-31 PROCEDURE — 96375 TX/PRO/DX INJ NEW DRUG ADDON: CPT

## 2023-08-31 PROCEDURE — G0257 UNSCHED DIALYSIS ESRD PT HOS: HCPCS

## 2023-08-31 PROCEDURE — 90935 HEMODIALYSIS ONE EVALUATION: CPT

## 2023-08-31 PROCEDURE — 94760 N-INVAS EAR/PLS OXIMETRY 1: CPT

## 2023-08-31 PROCEDURE — 85610 PROTHROMBIN TIME: CPT

## 2023-08-31 PROCEDURE — 85730 THROMBOPLASTIN TIME PARTIAL: CPT

## 2023-08-31 PROCEDURE — 71045 X-RAY EXAM CHEST 1 VIEW: CPT

## 2023-08-31 PROCEDURE — 36415 COLL VENOUS BLD VENIPUNCTURE: CPT

## 2023-08-31 PROCEDURE — 83735 ASSAY OF MAGNESIUM: CPT

## 2023-08-31 PROCEDURE — G0378 HOSPITAL OBSERVATION PER HR: HCPCS

## 2023-08-31 PROCEDURE — 84443 ASSAY THYROID STIM HORMONE: CPT

## 2023-08-31 PROCEDURE — 96361 HYDRATE IV INFUSION ADD-ON: CPT

## 2023-09-01 LAB
ANION GAP SERPL CALC-SCNC: 16 MMOL/L (ref 10–20)
BASOPHILS # BLD AUTO: 0 THOU/UL (ref 0–0.2)
BASOPHILS NFR BLD AUTO: 0.5 % (ref 0–1)
BUN SERPL-MCNC: 25 MG/DL (ref 8.4–25.7)
CALCIUM SERPL-MCNC: 9.4 MG/DL (ref 7.8–10.44)
CHLORIDE SERPL-SCNC: 100 MMOL/L (ref 98–107)
CO2 SERPL-SCNC: 26 MMOL/L (ref 23–31)
COMM CRITICAL RESULTS DOC: (no result)
CREAT CL PREDICTED SERPL C-G-VRATE: 13 ML/MIN (ref 70–130)
EOSINOPHIL # BLD AUTO: 0.1 THOU/UL (ref 0–0.7)
EOSINOPHIL NFR BLD AUTO: 1.9 % (ref 0–10)
GLUCOSE SERPL-MCNC: 111 MG/DL (ref 80–115)
HCT VFR BLD CALC: 40.1 % (ref 42–52)
HGB BLD-MCNC: 12.6 G/DL (ref 14–18)
LYMPHOCYTES NFR BLD AUTO: 22.9 % (ref 21–51)
MCH RBC QN AUTO: 28.3 PG (ref 27–31)
MCV RBC AUTO: 90.1 FL (ref 78–98)
MONOCYTES # BLD AUTO: 0.7 THOU/UL (ref 0.11–0.59)
MONOCYTES NFR BLD AUTO: 11.7 % (ref 0–10)
NEUTROPHILS # BLD AUTO: 3.9 THOU/UL (ref 1.4–6.5)
NEUTROPHILS NFR BLD AUTO: 63 % (ref 42–75)
PLATELET # BLD AUTO: 132 10X3/UL (ref 130–400)
POTASSIUM SERPL-SCNC: 4.1 MMOL/L (ref 3.5–5.1)
RBC # BLD AUTO: 4.45 MILL/UL (ref 4.7–6.1)
SODIUM SERPL-SCNC: 138 MMOL/L (ref 136–145)
TROPONIN I SERPL DL<=0.01 NG/ML-MCNC: 0.26 NG/ML (ref ?–0.03)
TROPONIN I SERPL DL<=0.01 NG/ML-MCNC: 0.5 NG/ML (ref ?–0.03)
TROPONIN I SERPL DL<=0.01 NG/ML-MCNC: 0.6 NG/ML (ref ?–0.03)
TROPONIN I SERPL DL<=0.01 NG/ML-MCNC: 0.61 NG/ML (ref ?–0.03)
WBC # BLD AUTO: 6.2 10X3/UL (ref 4.8–10.8)

## 2023-09-01 RX ADMIN — HEPARIN SODIUM SCH UNITS: 5000 INJECTION, SOLUTION INTRAVENOUS; SUBCUTANEOUS at 20:48

## 2023-09-01 RX ADMIN — HEPARIN SODIUM SCH UNITS: 5000 INJECTION, SOLUTION INTRAVENOUS; SUBCUTANEOUS at 14:43

## 2023-09-01 RX ADMIN — HEPARIN SODIUM SCH UNITS: 5000 INJECTION, SOLUTION INTRAVENOUS; SUBCUTANEOUS at 10:23

## 2023-09-02 VITALS — SYSTOLIC BLOOD PRESSURE: 105 MMHG | DIASTOLIC BLOOD PRESSURE: 66 MMHG | TEMPERATURE: 98.5 F

## 2023-09-02 LAB
ANION GAP SERPL CALC-SCNC: 19 MMOL/L (ref 10–20)
BASOPHILS # BLD AUTO: 0 THOU/UL (ref 0–0.2)
BASOPHILS NFR BLD AUTO: 0.7 % (ref 0–1)
BUN SERPL-MCNC: 38 MG/DL (ref 8.4–25.7)
CALCIUM SERPL-MCNC: 9.9 MG/DL (ref 7.8–10.44)
CHLORIDE SERPL-SCNC: 100 MMOL/L (ref 98–107)
CO2 SERPL-SCNC: 21 MMOL/L (ref 23–31)
CREAT CL PREDICTED SERPL C-G-VRATE: 9 ML/MIN (ref 70–130)
EOSINOPHIL # BLD AUTO: 0.2 THOU/UL (ref 0–0.7)
EOSINOPHIL NFR BLD AUTO: 3 % (ref 0–10)
GLUCOSE SERPL-MCNC: 78 MG/DL (ref 80–115)
HBSAG INDEX: 0.26 S/CO (ref 0–0.99)
HBV SURFACE AB SERPL IA-ACNC: 48.36 MIU/ML
HCT VFR BLD CALC: 41.1 % (ref 42–52)
HGB BLD-MCNC: 12.6 G/DL (ref 14–18)
LYMPHOCYTES NFR BLD AUTO: 27.4 % (ref 21–51)
MAGNESIUM SERPL-MCNC: 2.4 MG/DL (ref 1.6–2.6)
MCH RBC QN AUTO: 28.6 PG (ref 27–31)
MCV RBC AUTO: 93.2 FL (ref 78–98)
MONOCYTES # BLD AUTO: 0.5 THOU/UL (ref 0.11–0.59)
MONOCYTES NFR BLD AUTO: 9.3 % (ref 0–10)
NEUTROPHILS # BLD AUTO: 3.3 THOU/UL (ref 1.4–6.5)
NEUTROPHILS NFR BLD AUTO: 59.2 % (ref 42–75)
PLATELET # BLD AUTO: 127 10X3/UL (ref 130–400)
POTASSIUM SERPL-SCNC: 4.1 MMOL/L (ref 3.5–5.1)
RBC # BLD AUTO: 4.41 MILL/UL (ref 4.7–6.1)
SODIUM SERPL-SCNC: 136 MMOL/L (ref 136–145)
WBC # BLD AUTO: 5.6 10X3/UL (ref 4.8–10.8)

## 2023-09-02 RX ADMIN — HEPARIN SODIUM SCH UNITS: 5000 INJECTION, SOLUTION INTRAVENOUS; SUBCUTANEOUS at 17:33

## 2023-09-02 RX ADMIN — HEPARIN SODIUM SCH UNITS: 5000 INJECTION, SOLUTION INTRAVENOUS; SUBCUTANEOUS at 10:29

## 2025-01-19 ENCOUNTER — HOSPITAL ENCOUNTER (INPATIENT)
Dept: HOSPITAL 92 - ERS | Age: 70
LOS: 5 days | Discharge: HOSPICE | DRG: 640 | End: 2025-01-24
Attending: HOSPITALIST | Admitting: INTERNAL MEDICINE
Payer: MEDICARE

## 2025-01-19 VITALS — BODY MASS INDEX: 29.2 KG/M2

## 2025-01-19 DIAGNOSIS — I42.8: ICD-10-CM

## 2025-01-19 DIAGNOSIS — Z51.5: ICD-10-CM

## 2025-01-19 DIAGNOSIS — Z66: ICD-10-CM

## 2025-01-19 DIAGNOSIS — R79.89: ICD-10-CM

## 2025-01-19 DIAGNOSIS — K21.9: ICD-10-CM

## 2025-01-19 DIAGNOSIS — Z86.73: ICD-10-CM

## 2025-01-19 DIAGNOSIS — Z79.899: ICD-10-CM

## 2025-01-19 DIAGNOSIS — Z99.2: ICD-10-CM

## 2025-01-19 DIAGNOSIS — D63.1: ICD-10-CM

## 2025-01-19 DIAGNOSIS — J96.01: ICD-10-CM

## 2025-01-19 DIAGNOSIS — Z91.158: ICD-10-CM

## 2025-01-19 DIAGNOSIS — F10.90: ICD-10-CM

## 2025-01-19 DIAGNOSIS — E87.5: ICD-10-CM

## 2025-01-19 DIAGNOSIS — Z95.810: ICD-10-CM

## 2025-01-19 DIAGNOSIS — E87.20: ICD-10-CM

## 2025-01-19 DIAGNOSIS — K76.89: ICD-10-CM

## 2025-01-19 DIAGNOSIS — I21.A1: ICD-10-CM

## 2025-01-19 DIAGNOSIS — I48.20: ICD-10-CM

## 2025-01-19 DIAGNOSIS — N18.6: ICD-10-CM

## 2025-01-19 DIAGNOSIS — I13.2: ICD-10-CM

## 2025-01-19 DIAGNOSIS — E87.70: Primary | ICD-10-CM

## 2025-01-19 LAB
ALBUMIN SERPL BCG-MCNC: 3.3 G/DL (ref 3.4–4.8)
ALP SERPL-CCNC: 107 U/L (ref 40–110)
ALT SERPL W P-5'-P-CCNC: 404 U/L (ref 8–55)
ANALYZER IN CARDIO: (no result)
ANION GAP SERPL CALC-SCNC: (no result) MMOL/L (ref 10–20)
AST SERPL-CCNC: 700 U/L (ref 5–34)
BASE EXCESS STD BLDA CALC-SCNC: -25 MEQ/L
BASOPHILS # BLD AUTO: (no result) 10X3/UL (ref 0–0.2)
BASOPHILS NFR BLD AUTO: 0.1 % (ref 0–1)
BILIRUB SERPL-MCNC: 1.3 MG/DL (ref 0.2–1.2)
BUN SERPL-MCNC: 122 MG/DL (ref 8.4–25.7)
CA-I BLDA-SCNC: 1.16 MMOL/L (ref 1.12–1.3)
CALCIUM SERPL-MCNC: 8.7 MG/DL (ref 7.8–10.44)
CHLORIDE SERPL-SCNC: 108 MMOL/L (ref 98–107)
CO2 SERPL-SCNC: (no result) MMOL/L (ref 23–31)
CREAT CL PREDICTED SERPL C-G-VRATE: 0 ML/MIN (ref 70–130)
EOSINOPHIL # BLD AUTO: (no result) 10X3/UL (ref 0–0.7)
EOSINOPHIL NFR BLD AUTO: 0 % (ref 0–10)
GLOBULIN SER CALC-MCNC: 3.9 G/DL (ref 2.4–3.5)
GLUCOSE SERPL-MCNC: 127 MG/DL (ref 83–110)
HCO3 BLDA-SCNC: 4.7 MEQ/L (ref 22–28)
HCT VFR BLD CALC: 32.9 % (ref 42–52)
HCT VFR BLDA CALC: 28 % (ref 42–52)
HGB BLD-MCNC: 9.4 G/DL (ref 14–18)
HGB BLDA-MCNC: 9.5 G/DL (ref 14–18)
LYMPHOCYTES NFR BLD AUTO: 2.6 % (ref 21–51)
MCH RBC QN AUTO: 28 PG (ref 27–31)
MCV RBC AUTO: 97.9 FL (ref 78–98)
MONOCYTES # BLD AUTO: 1.1 10X3/UL (ref 0.11–0.59)
MONOCYTES NFR BLD AUTO: 6 % (ref 0–10)
NEUTROPHILS # BLD AUTO: 17.1 10X3/UL (ref 1.4–6.5)
NEUTROPHILS NFR BLD AUTO: 90.2 % (ref 42–75)
PCO2 BLDA: 19.9 MMHG (ref 35–45)
PLATELET # BLD AUTO: 219 10X3/UL (ref 130–400)
PO2 BLDA: 572.5 MMHG (ref 70–?)
POTASSIUM BLD-SCNC: 6.18 MMOL/L (ref 3.7–5.3)
POTASSIUM SERPL-SCNC: 6.7 MMOL/L (ref 3.5–5.1)
RBC # BLD AUTO: 3.36 MILL/UL (ref 4.7–6.1)
SODIUM SERPL-SCNC: 145 MMOL/L (ref 136–145)
TROPONIN I SERPL DL<=0.01 NG/ML-MCNC: 0.39 NG/ML (ref ?–0.03)
WBC # BLD AUTO: 18.9 10X3/UL (ref 4.8–10.8)

## 2025-01-19 PROCEDURE — 71045 X-RAY EXAM CHEST 1 VIEW: CPT

## 2025-01-19 PROCEDURE — 96374 THER/PROPH/DIAG INJ IV PUSH: CPT

## 2025-01-19 PROCEDURE — 94644 CONT INHLJ TX 1ST HOUR: CPT

## 2025-01-19 PROCEDURE — 87340 HEPATITIS B SURFACE AG IA: CPT

## 2025-01-19 PROCEDURE — 36415 COLL VENOUS BLD VENIPUNCTURE: CPT

## 2025-01-19 PROCEDURE — 85025 COMPLETE CBC W/AUTO DIFF WBC: CPT

## 2025-01-19 PROCEDURE — 36600 WITHDRAWAL OF ARTERIAL BLOOD: CPT

## 2025-01-19 PROCEDURE — 86803 HEPATITIS C AB TEST: CPT

## 2025-01-19 PROCEDURE — 82805 BLOOD GASES W/O2 SATURATION: CPT

## 2025-01-19 PROCEDURE — 93005 ELECTROCARDIOGRAM TRACING: CPT

## 2025-01-19 PROCEDURE — 94660 CPAP INITIATION&MGMT: CPT

## 2025-01-19 PROCEDURE — 96376 TX/PRO/DX INJ SAME DRUG ADON: CPT

## 2025-01-19 PROCEDURE — 86704 HEP B CORE ANTIBODY TOTAL: CPT

## 2025-01-19 PROCEDURE — 86706 HEP B SURFACE ANTIBODY: CPT

## 2025-01-19 PROCEDURE — 93306 TTE W/DOPPLER COMPLETE: CPT

## 2025-01-19 PROCEDURE — 83880 ASSAY OF NATRIURETIC PEPTIDE: CPT

## 2025-01-19 PROCEDURE — 36416 COLLJ CAPILLARY BLOOD SPEC: CPT

## 2025-01-19 PROCEDURE — 80053 COMPREHEN METABOLIC PANEL: CPT

## 2025-01-19 PROCEDURE — 96375 TX/PRO/DX INJ NEW DRUG ADDON: CPT

## 2025-01-19 PROCEDURE — 90935 HEMODIALYSIS ONE EVALUATION: CPT

## 2025-01-19 PROCEDURE — G0257 UNSCHED DIALYSIS ESRD PT HOS: HCPCS

## 2025-01-19 PROCEDURE — 84484 ASSAY OF TROPONIN QUANT: CPT

## 2025-01-19 PROCEDURE — 94640 AIRWAY INHALATION TREATMENT: CPT

## 2025-01-20 LAB
ALBUMIN SERPL BCG-MCNC: 3.1 G/DL (ref 3.4–4.8)
ALBUMIN SERPL BCG-MCNC: 3.5 G/DL (ref 3.4–4.8)
ALP SERPL-CCNC: 103 U/L (ref 40–110)
ALP SERPL-CCNC: 121 U/L (ref 40–110)
ALT SERPL W P-5'-P-CCNC: 837 U/L (ref 8–55)
ALT SERPL W P-5'-P-CCNC: 927 U/L (ref 8–55)
ANALYZER IN CARDIO: (no result)
ANION GAP SERPL CALC-SCNC: 26 MMOL/L (ref 10–20)
ANION GAP SERPL CALC-SCNC: 26 MMOL/L (ref 10–20)
ANION GAP SERPL CALC-SCNC: 39 MMOL/L (ref 10–20)
AST SERPL-CCNC: 1421 U/L (ref 5–34)
AST SERPL-CCNC: 1529 U/L (ref 5–34)
BASE EXCESS STD BLDV CALC-SCNC: -1 MEQ/L
BASOPHILS # BLD AUTO: (no result) 10X3/UL (ref 0–0.2)
BASOPHILS # BLD AUTO: (no result) 10X3/UL (ref 0–0.2)
BASOPHILS NFR BLD AUTO: 0.1 % (ref 0–1)
BASOPHILS NFR BLD AUTO: 0.1 % (ref 0–1)
BILIRUB SERPL-MCNC: 1.1 MG/DL (ref 0.2–1.2)
BILIRUB SERPL-MCNC: 1.3 MG/DL (ref 0.2–1.2)
BUN SERPL-MCNC: 118 MG/DL (ref 8.4–25.7)
BUN SERPL-MCNC: 50 MG/DL (ref 8.4–25.7)
BUN SERPL-MCNC: 63 MG/DL (ref 8.4–25.7)
CA-I BLDV-MCNC: 1.03 MMOL/L (ref 1.16–1.32)
CALCIUM SERPL-MCNC: 8.3 MG/DL (ref 7.8–10.44)
CALCIUM SERPL-MCNC: 8.8 MG/DL (ref 7.8–10.44)
CALCIUM SERPL-MCNC: 9 MG/DL (ref 7.8–10.44)
CHLORIDE BLDV-SCNC: 100 MMOL/L (ref 98–106)
CHLORIDE SERPL-SCNC: 102 MMOL/L (ref 98–107)
CHLORIDE SERPL-SCNC: 103 MMOL/L (ref 98–107)
CHLORIDE SERPL-SCNC: 107 MMOL/L (ref 98–107)
CO2 SERPL-SCNC: 19 MMOL/L (ref 23–31)
CO2 SERPL-SCNC: 20 MMOL/L (ref 23–31)
CO2 SERPL-SCNC: 8 MMOL/L (ref 23–31)
CREAT CL PREDICTED SERPL C-G-VRATE: 0 ML/MIN (ref 70–130)
CREAT CL PREDICTED SERPL C-G-VRATE: 12 ML/MIN (ref 70–130)
CREAT CL PREDICTED SERPL C-G-VRATE: 9 ML/MIN (ref 70–130)
EOSINOPHIL # BLD AUTO: (no result) 10X3/UL (ref 0–0.7)
EOSINOPHIL # BLD AUTO: (no result) 10X3/UL (ref 0–0.7)
EOSINOPHIL NFR BLD AUTO: 0 % (ref 0–10)
EOSINOPHIL NFR BLD AUTO: 0.1 % (ref 0–10)
GLOBULIN SER CALC-MCNC: 3.3 G/DL (ref 2.4–3.5)
GLOBULIN SER CALC-MCNC: 4 G/DL (ref 2.4–3.5)
GLUCOSE SERPL-MCNC: 135 MG/DL (ref 83–110)
GLUCOSE SERPL-MCNC: 149 MG/DL (ref 83–110)
GLUCOSE SERPL-MCNC: 183 MG/DL (ref 83–110)
HBV CORE IGG+IGM SER QL: NONREACTIVE
HBV SURFACE AB SERPL IA-ACNC: 29.47 MIU/ML
HBV SURFACE AG SERPL QL IA: NONREACTIVE S/CO
HCO3 BLDV-SCNC: 22.3 MEQ/L (ref 22–28)
HCT VFR BLD CALC: 26.5 % (ref 42–52)
HCT VFR BLD CALC: 27.5 % (ref 42–52)
HCT VFR BLDV CALC: 30 % (ref 42–52)
HCV AB SERPL QL IA: NONREACTIVE S/CO
HEP B CORE TOTAL INDEX: 0.08 S/CO (ref 0–0.79)
HEP C INDEX: 0.17 S/CO (ref 0–0.79)
HGB BLD-MCNC: 8.5 G/DL (ref 14–18)
HGB BLD-MCNC: 9.3 G/DL (ref 14–18)
HGB BLDV-MCNC: 10.2 G/DL (ref 12.6–17.4)
LYMPHOCYTES NFR BLD AUTO: 1.7 % (ref 21–51)
LYMPHOCYTES NFR BLD AUTO: 2.6 % (ref 21–51)
MCH RBC QN AUTO: 27.8 PG (ref 27–31)
MCH RBC QN AUTO: 27.8 PG (ref 27–31)
MCV RBC AUTO: 82.3 FL (ref 78–98)
MCV RBC AUTO: 86.6 FL (ref 78–98)
MONOCYTES # BLD AUTO: 0.4 10X3/UL (ref 0.11–0.59)
MONOCYTES # BLD AUTO: 0.7 10X3/UL (ref 0.11–0.59)
MONOCYTES NFR BLD AUTO: 2 % (ref 0–10)
MONOCYTES NFR BLD AUTO: 4.1 % (ref 0–10)
NEUTROPHILS # BLD AUTO: 14.6 10X3/UL (ref 1.4–6.5)
NEUTROPHILS # BLD AUTO: 17.8 10X3/UL (ref 1.4–6.5)
NEUTROPHILS NFR BLD AUTO: 92.4 % (ref 42–75)
NEUTROPHILS NFR BLD AUTO: 95.5 % (ref 42–75)
PH BLDA: 6.99 [PH] (ref 7.35–7.45)
PLATELET # BLD AUTO: 152 10X3/UL (ref 130–400)
PLATELET # BLD AUTO: 173 10X3/UL (ref 130–400)
POTASSIUM BLDV-SCNC: 3.31 MMOL/L (ref 3.7–5.3)
POTASSIUM SERPL-SCNC: 3.4 MMOL/L (ref 3.5–5.1)
POTASSIUM SERPL-SCNC: 4 MMOL/L (ref 3.5–5.1)
POTASSIUM SERPL-SCNC: 6.2 MMOL/L (ref 3.5–5.1)
RBC # BLD AUTO: 3.06 MILL/UL (ref 4.7–6.1)
RBC # BLD AUTO: 3.34 MILL/UL (ref 4.7–6.1)
SODIUM BLDV-SCNC: 143 MMOL/L (ref 133–146)
SODIUM SERPL-SCNC: 144 MMOL/L (ref 136–145)
SODIUM SERPL-SCNC: 145 MMOL/L (ref 136–145)
SODIUM SERPL-SCNC: 148 MMOL/L (ref 136–145)
TROPONIN I SERPL DL<=0.01 NG/ML-MCNC: 0.52 NG/ML (ref ?–0.03)
WBC # BLD AUTO: 15.8 10X3/UL (ref 4.8–10.8)
WBC # BLD AUTO: 18.6 10X3/UL (ref 4.8–10.8)

## 2025-01-20 PROCEDURE — 5A09357 ASSISTANCE WITH RESPIRATORY VENTILATION, LESS THAN 24 CONSECUTIVE HOURS, CONTINUOUS POSITIVE AIRWAY PRESSURE: ICD-10-PCS | Performed by: HOSPITALIST

## 2025-01-20 RX ADMIN — NITROGLYCERIN SCH INCH: 20 OINTMENT TOPICAL at 07:51

## 2025-01-20 RX ADMIN — SODIUM BICARBONATE SCH MLS: 84 INJECTION, SOLUTION INTRAVENOUS at 00:27

## 2025-01-20 RX ADMIN — DOCUSATE SODIUM 50 MG AND SENNOSIDES 8.6 MG SCH TAB: 8.6; 5 TABLET, FILM COATED ORAL at 07:49

## 2025-01-20 RX ADMIN — EPOETIN ALFA-EPBX SCH: 4000 INJECTION, SOLUTION INTRAVENOUS; SUBCUTANEOUS at 12:49

## 2025-01-20 RX ADMIN — HEPARIN SODIUM SCH UNITS: 5000 INJECTION, SOLUTION INTRAVENOUS; SUBCUTANEOUS at 07:50

## 2025-01-20 RX ADMIN — Medication SCH TAB: at 07:48

## 2025-01-20 RX ADMIN — FUROSEMIDE SCH: 10 INJECTION, SOLUTION INTRAVENOUS at 00:55

## 2025-01-20 RX ADMIN — ALBUTEROL SULFATE SCH MG: 2.5 SOLUTION RESPIRATORY (INHALATION) at 02:07

## 2025-01-20 RX ADMIN — ASPIRIN SCH MG: 81 TABLET ORAL at 07:48

## 2025-01-20 RX ADMIN — EPOETIN ALFA-EPBX SCH UNITS: 10000 INJECTION, SOLUTION INTRAVENOUS; SUBCUTANEOUS at 14:22

## 2025-01-20 RX ADMIN — SODIUM BICARBONATE SCH: 84 INJECTION, SOLUTION INTRAVENOUS at 04:18

## 2025-01-20 RX ADMIN — FAMOTIDINE SCH MG: 10 INJECTION, SOLUTION INTRAVENOUS at 07:49

## 2025-01-21 LAB
ALBUMIN SERPL BCG-MCNC: 3.1 G/DL (ref 3.4–4.8)
ALP SERPL-CCNC: 116 U/L (ref 40–110)
ALT SERPL W P-5'-P-CCNC: 718 U/L (ref 8–55)
ANION GAP SERPL CALC-SCNC: 25 MMOL/L (ref 10–20)
AST SERPL-CCNC: 723 U/L (ref 5–34)
BASOPHILS # BLD AUTO: (no result) 10X3/UL (ref 0–0.2)
BASOPHILS NFR BLD AUTO: 0.1 % (ref 0–1)
BILIRUB SERPL-MCNC: 1.3 MG/DL (ref 0.2–1.2)
BUN SERPL-MCNC: 73 MG/DL (ref 8.4–25.7)
CALCIUM SERPL-MCNC: 8.5 MG/DL (ref 7.8–10.44)
CHLORIDE SERPL-SCNC: 103 MMOL/L (ref 98–107)
CO2 SERPL-SCNC: 20 MMOL/L (ref 23–31)
CREAT CL PREDICTED SERPL C-G-VRATE: 8 ML/MIN (ref 70–130)
EOSINOPHIL # BLD AUTO: (no result) 10X3/UL (ref 0–0.7)
EOSINOPHIL NFR BLD AUTO: 0.1 % (ref 0–10)
GLOBULIN SER CALC-MCNC: 3.4 G/DL (ref 2.4–3.5)
GLUCOSE SERPL-MCNC: 71 MG/DL (ref 83–110)
HCT VFR BLD CALC: 28.9 % (ref 42–52)
HGB BLD-MCNC: 9.4 G/DL (ref 14–18)
LYMPHOCYTES NFR BLD AUTO: 8.4 % (ref 21–51)
MCH RBC QN AUTO: 27.9 PG (ref 27–31)
MCV RBC AUTO: 85.8 FL (ref 78–98)
MONOCYTES # BLD AUTO: 1.1 10X3/UL (ref 0.11–0.59)
MONOCYTES NFR BLD AUTO: 9.3 % (ref 0–10)
NEUTROPHILS # BLD AUTO: 9.5 10X3/UL (ref 1.4–6.5)
NEUTROPHILS NFR BLD AUTO: 81.5 % (ref 42–75)
PLATELET # BLD AUTO: 131 10X3/UL (ref 130–400)
POTASSIUM SERPL-SCNC: 4.6 MMOL/L (ref 3.5–5.1)
RBC # BLD AUTO: 3.37 MILL/UL (ref 4.7–6.1)
SODIUM SERPL-SCNC: 143 MMOL/L (ref 136–145)
WBC # BLD AUTO: 11.6 10X3/UL (ref 4.8–10.8)

## 2025-01-22 LAB
ALBUMIN SERPL BCG-MCNC: 2.6 G/DL (ref 3.1–4.5)
ALP SERPL-CCNC: 107 U/L (ref 40–110)
ALT SERPL W P-5'-P-CCNC: 524 U/L (ref ?–45)
ANION GAP SERPL CALC-SCNC: 19 MMOL/L (ref 10–20)
AST SERPL-CCNC: 409 U/L (ref 11–34)
BASOPHILS # BLD AUTO: (no result) 10X3/UL (ref 0–0.2)
BASOPHILS NFR BLD AUTO: 0.1 % (ref 0–1)
BILIRUB SERPL-MCNC: 1.1 MG/DL (ref 0.3–1.2)
BUN SERPL-MCNC: 47 MG/DL (ref 8.4–25.7)
CALCIUM SERPL-MCNC: 8.6 MG/DL (ref 7.8–10.44)
CHLORIDE SERPL-SCNC: 104 MMOL/L (ref 98–107)
CO2 SERPL-SCNC: 23 MMOL/L (ref 23–31)
CREAT CL PREDICTED SERPL C-G-VRATE: 11 ML/MIN (ref 70–130)
EOSINOPHIL # BLD AUTO: 0.2 10X3/UL (ref 0–0.7)
EOSINOPHIL NFR BLD AUTO: 1.5 % (ref 0–10)
GLOBULIN SER CALC-MCNC: 3.1 G/DL (ref 2.4–3.5)
GLUCOSE SERPL-MCNC: 83 MG/DL (ref 83–110)
HCT VFR BLD CALC: 28.9 % (ref 42–52)
HGB BLD-MCNC: 9.7 G/DL (ref 14–18)
LYMPHOCYTES NFR BLD AUTO: 8 % (ref 21–51)
MCH RBC QN AUTO: 28 PG (ref 27–31)
MCV RBC AUTO: 83.3 FL (ref 78–98)
MONOCYTES # BLD AUTO: 1.4 10X3/UL (ref 0.11–0.59)
MONOCYTES NFR BLD AUTO: 12.4 % (ref 0–10)
NEUTROPHILS # BLD AUTO: 8.8 10X3/UL (ref 1.4–6.5)
NEUTROPHILS NFR BLD AUTO: 77 % (ref 42–75)
PLATELET # BLD AUTO: 134 10X3/UL (ref 130–400)
POTASSIUM SERPL-SCNC: 3.5 MMOL/L (ref 3.5–5.1)
RBC # BLD AUTO: 3.47 MILL/UL (ref 4.7–6.1)
SODIUM SERPL-SCNC: 142 MMOL/L (ref 136–145)
WBC # BLD AUTO: 11.4 10X3/UL (ref 4.8–10.8)

## 2025-01-22 RX ADMIN — FAMOTIDINE SCH: 10 INJECTION, SOLUTION INTRAVENOUS at 08:35

## 2025-01-23 LAB
ALBUMIN SERPL BCG-MCNC: 2.7 G/DL (ref 3.1–4.5)
ALP SERPL-CCNC: 102 U/L (ref 40–110)
ALT SERPL W P-5'-P-CCNC: 455 U/L (ref ?–45)
ANION GAP SERPL CALC-SCNC: 18 MMOL/L (ref 10–20)
AST SERPL-CCNC: 291 U/L (ref 11–34)
BASOPHILS # BLD AUTO: (no result) 10X3/UL (ref 0–0.2)
BASOPHILS NFR BLD AUTO: 0.1 % (ref 0–1)
BILIRUB SERPL-MCNC: 0.8 MG/DL (ref 0.3–1.2)
BUN SERPL-MCNC: 56 MG/DL (ref 8.4–25.7)
CALCIUM SERPL-MCNC: 8.6 MG/DL (ref 7.8–10.44)
CHLORIDE SERPL-SCNC: 104 MMOL/L (ref 98–107)
CO2 SERPL-SCNC: 23 MMOL/L (ref 23–31)
CREAT CL PREDICTED SERPL C-G-VRATE: 9 ML/MIN (ref 70–130)
EOSINOPHIL # BLD AUTO: 0.3 10X3/UL (ref 0–0.7)
EOSINOPHIL NFR BLD AUTO: 2.5 % (ref 0–10)
GLOBULIN SER CALC-MCNC: 3.1 G/DL (ref 2.4–3.5)
GLUCOSE SERPL-MCNC: 87 MG/DL (ref 83–110)
HCT VFR BLD CALC: 29.4 % (ref 42–52)
HGB BLD-MCNC: 9.4 G/DL (ref 14–18)
LYMPHOCYTES NFR BLD AUTO: 10.6 % (ref 21–51)
MCH RBC QN AUTO: 27.7 PG (ref 27–31)
MCV RBC AUTO: 86.7 FL (ref 78–98)
MONOCYTES # BLD AUTO: 1.5 10X3/UL (ref 0.11–0.59)
MONOCYTES NFR BLD AUTO: 13.7 % (ref 0–10)
NEUTROPHILS # BLD AUTO: 7.7 10X3/UL (ref 1.4–6.5)
NEUTROPHILS NFR BLD AUTO: 72.3 % (ref 42–75)
PLATELET # BLD AUTO: 145 10X3/UL (ref 130–400)
POTASSIUM SERPL-SCNC: 3.7 MMOL/L (ref 3.5–5.1)
RBC # BLD AUTO: 3.39 MILL/UL (ref 4.7–6.1)
SODIUM SERPL-SCNC: 141 MMOL/L (ref 136–145)
WBC # BLD AUTO: 10.6 10X3/UL (ref 4.8–10.8)

## 2025-01-24 VITALS — DIASTOLIC BLOOD PRESSURE: 80 MMHG | SYSTOLIC BLOOD PRESSURE: 134 MMHG

## 2025-01-24 VITALS — TEMPERATURE: 98.4 F
